# Patient Record
Sex: MALE | Race: WHITE | NOT HISPANIC OR LATINO | Employment: OTHER | URBAN - NONMETROPOLITAN AREA
[De-identification: names, ages, dates, MRNs, and addresses within clinical notes are randomized per-mention and may not be internally consistent; named-entity substitution may affect disease eponyms.]

---

## 2024-01-17 ENCOUNTER — HOSPITAL ENCOUNTER (EMERGENCY)
Facility: HOSPITAL | Age: 89
Discharge: HOME | End: 2024-01-17
Attending: STUDENT IN AN ORGANIZED HEALTH CARE EDUCATION/TRAINING PROGRAM | Admitting: STUDENT IN AN ORGANIZED HEALTH CARE EDUCATION/TRAINING PROGRAM
Payer: MEDICARE

## 2024-01-17 VITALS
WEIGHT: 200 LBS | TEMPERATURE: 97.8 F | RESPIRATION RATE: 18 BRPM | DIASTOLIC BLOOD PRESSURE: 80 MMHG | BODY MASS INDEX: 28.63 KG/M2 | HEART RATE: 72 BPM | HEIGHT: 70 IN | OXYGEN SATURATION: 94 % | SYSTOLIC BLOOD PRESSURE: 146 MMHG

## 2024-01-17 DIAGNOSIS — T83.9XXA PROBLEM WITH FOLEY CATHETER, INITIAL ENCOUNTER: Primary | ICD-10-CM

## 2024-01-17 LAB
ALBUMIN SERPL-MCNC: 3.8 G/DL (ref 3.5–5.2)
ALBUMIN/GLOB SERPL: 1.2 G/DL
ALP SERPL-CCNC: 95 U/L (ref 39–117)
ALT SERPL W P-5'-P-CCNC: 13 U/L (ref 1–41)
ANION GAP SERPL CALCULATED.3IONS-SCNC: 9.1 MMOL/L (ref 5–15)
AST SERPL-CCNC: 16 U/L (ref 1–40)
BASOPHILS # BLD AUTO: 0.03 10*3/MM3 (ref 0–0.2)
BASOPHILS NFR BLD AUTO: 0.3 % (ref 0–1.5)
BILIRUB SERPL-MCNC: 0.5 MG/DL (ref 0–1.2)
BUN SERPL-MCNC: 20 MG/DL (ref 8–23)
BUN/CREAT SERPL: 14 (ref 7–25)
CALCIUM SPEC-SCNC: 9.3 MG/DL (ref 8.2–9.6)
CHLORIDE SERPL-SCNC: 102 MMOL/L (ref 98–107)
CO2 SERPL-SCNC: 26.9 MMOL/L (ref 22–29)
CREAT SERPL-MCNC: 1.43 MG/DL (ref 0.76–1.27)
DEPRECATED RDW RBC AUTO: 50.5 FL (ref 37–54)
EGFRCR SERPLBLD CKD-EPI 2021: 46.3 ML/MIN/1.73
EOSINOPHIL # BLD AUTO: 0.07 10*3/MM3 (ref 0–0.4)
EOSINOPHIL NFR BLD AUTO: 0.7 % (ref 0.3–6.2)
ERYTHROCYTE [DISTWIDTH] IN BLOOD BY AUTOMATED COUNT: 15.2 % (ref 12.3–15.4)
GLOBULIN UR ELPH-MCNC: 3.1 GM/DL
GLUCOSE SERPL-MCNC: 109 MG/DL (ref 65–99)
HCT VFR BLD AUTO: 45.8 % (ref 37.5–51)
HGB BLD-MCNC: 14.5 G/DL (ref 13–17.7)
HOLD SPECIMEN: NORMAL
HOLD SPECIMEN: NORMAL
IMM GRANULOCYTES # BLD AUTO: 0.05 10*3/MM3 (ref 0–0.05)
IMM GRANULOCYTES NFR BLD AUTO: 0.5 % (ref 0–0.5)
LYMPHOCYTES # BLD AUTO: 1.35 10*3/MM3 (ref 0.7–3.1)
LYMPHOCYTES NFR BLD AUTO: 14.1 % (ref 19.6–45.3)
MCH RBC QN AUTO: 29.1 PG (ref 26.6–33)
MCHC RBC AUTO-ENTMCNC: 31.7 G/DL (ref 31.5–35.7)
MCV RBC AUTO: 91.8 FL (ref 79–97)
MONOCYTES # BLD AUTO: 0.79 10*3/MM3 (ref 0.1–0.9)
MONOCYTES NFR BLD AUTO: 8.3 % (ref 5–12)
NEUTROPHILS NFR BLD AUTO: 7.26 10*3/MM3 (ref 1.7–7)
NEUTROPHILS NFR BLD AUTO: 76.1 % (ref 42.7–76)
NRBC BLD AUTO-RTO: 0 /100 WBC (ref 0–0.2)
PLATELET # BLD AUTO: 149 10*3/MM3 (ref 140–450)
PMV BLD AUTO: 9.6 FL (ref 6–12)
POTASSIUM SERPL-SCNC: 4.9 MMOL/L (ref 3.5–5.2)
PROT SERPL-MCNC: 6.9 G/DL (ref 6–8.5)
RBC # BLD AUTO: 4.99 10*6/MM3 (ref 4.14–5.8)
SODIUM SERPL-SCNC: 138 MMOL/L (ref 136–145)
WBC NRBC COR # BLD AUTO: 9.55 10*3/MM3 (ref 3.4–10.8)
WHOLE BLOOD HOLD COAG: NORMAL
WHOLE BLOOD HOLD SPECIMEN: NORMAL

## 2024-01-17 PROCEDURE — 80053 COMPREHEN METABOLIC PANEL: CPT | Performed by: STUDENT IN AN ORGANIZED HEALTH CARE EDUCATION/TRAINING PROGRAM

## 2024-01-17 PROCEDURE — 85025 COMPLETE CBC W/AUTO DIFF WBC: CPT | Performed by: STUDENT IN AN ORGANIZED HEALTH CARE EDUCATION/TRAINING PROGRAM

## 2024-01-17 PROCEDURE — 36415 COLL VENOUS BLD VENIPUNCTURE: CPT

## 2024-01-17 PROCEDURE — 51702 INSERT TEMP BLADDER CATH: CPT

## 2024-01-17 PROCEDURE — 99282 EMERGENCY DEPT VISIT SF MDM: CPT

## 2024-01-17 NOTE — ED PROVIDER NOTES
Subjective   History of Present Illness  Patient is a 89-year-old male with history significant for chronic urinary retention due to BPH with indwelling Leos and recent right lower extremity DVT who comes to the ER with complaints of Leos catheter coming out and right lower extremity swelling.  He reports he has been off Eliquis for the last few days however right lower extremity has been swollen for several weeks worse on the left.  This does get worse throughout the day and the more he is on his feet.  He has been holding his Eliquis due to a  procedure in which he is going to have warts removed from the genitals.  He reports he has a chronic indwelling Leos however the Elos catheter slid out today-state the bulb of it was busted and came out easily.  He came to the ER for this to be replaced.  Denies any fevers or chills or any other symptoms.      Review of Systems   Constitutional:  Negative for chills, fatigue and fever.   HENT:  Negative for congestion, sinus pain and sore throat.    Respiratory:  Negative for cough, chest tightness, shortness of breath and wheezing.    Cardiovascular:  Positive for leg swelling. Negative for chest pain and palpitations.   Gastrointestinal:  Negative for abdominal pain, constipation, diarrhea, nausea and vomiting.   Genitourinary:  Negative for dysuria, frequency, hematuria and urgency.   Musculoskeletal:  Negative for arthralgias and myalgias.   Neurological:  Negative for dizziness, numbness and headaches.   Psychiatric/Behavioral:  Negative for confusion.        Past Medical History:   Diagnosis Date    Dementia     Renal disorder        Allergies   Allergen Reactions    Penicillins Hives       No past surgical history on file.    Family History   Problem Relation Age of Onset    No Known Problems Father     No Known Problems Mother        Social History     Socioeconomic History    Marital status:    Tobacco Use    Smoking status: Former    Smokeless tobacco:  Never   Vaping Use    Vaping Use: Never used   Substance and Sexual Activity    Alcohol use: Never    Drug use: Never    Sexual activity: Defer           Objective   Physical Exam  Vitals and nursing note reviewed. Exam conducted with a chaperone present.   Constitutional:       General: He is not in acute distress.     Appearance: He is well-developed and normal weight. He is not ill-appearing.   HENT:      Head: Normocephalic.      Nose: Nose normal.      Mouth/Throat:      Mouth: Mucous membranes are moist.      Pharynx: Oropharynx is clear.   Eyes:      Extraocular Movements: Extraocular movements intact.      Pupils: Pupils are equal, round, and reactive to light.   Neck:      Vascular: No JVD.   Cardiovascular:      Rate and Rhythm: Normal rate and regular rhythm.      Heart sounds: No murmur heard.     No friction rub. No gallop.   Pulmonary:      Effort: Pulmonary effort is normal. No tachypnea.      Breath sounds: Normal breath sounds. No decreased breath sounds, wheezing, rhonchi or rales.   Abdominal:      General: Bowel sounds are normal.      Palpations: Abdomen is soft.   Musculoskeletal:         General: Normal range of motion.      Cervical back: Normal range of motion and neck supple.      Right lower leg: Edema present.      Left lower leg: Edema present.   Skin:     General: Skin is warm and dry.      Capillary Refill: Capillary refill takes less than 2 seconds.   Neurological:      General: No focal deficit present.      Mental Status: He is alert and oriented to person, place, and time.   Psychiatric:         Mood and Affect: Mood normal.         Behavior: Behavior normal.         Procedures           ED Course                                             Medical Decision Making  --Patient is hemodynamically stable  --Labs unremarkable  --Leos catheter reinserted  --Instructed to wear compression stockings, elevate legs at rest, follow-up with PCP in 1 week and keep urology appointments as  scheduled    Amount and/or Complexity of Data Reviewed  Labs: ordered.        Final diagnoses:   Problem with Leos catheter, initial encounter       ED Disposition  ED Disposition       ED Disposition   Discharge    Condition   Stable    Comment   --               Babar Marcus MD  [address] [facility] [address] [phone]    In 1 week      Jorge Alberto Francis MD  [address] [facility] [address]  [place] [place] [address] [phone]    Call            Medication List      No changes were made to your prescriptions during this visit.            Denis Medina DO  01/17/24 1917

## 2024-02-20 ENCOUNTER — PROCEDURE VISIT (OUTPATIENT)
Dept: UROLOGY | Facility: CLINIC | Age: 89
End: 2024-02-20
Payer: MEDICARE

## 2024-02-20 VITALS
HEIGHT: 70 IN | BODY MASS INDEX: 24.51 KG/M2 | SYSTOLIC BLOOD PRESSURE: 146 MMHG | HEART RATE: 68 BPM | DIASTOLIC BLOOD PRESSURE: 82 MMHG | WEIGHT: 171.2 LBS

## 2024-02-20 DIAGNOSIS — A63.0 GENITAL WARTS: Primary | ICD-10-CM

## 2024-02-20 PROCEDURE — 99213 OFFICE O/P EST LOW 20 MIN: CPT | Performed by: UROLOGY

## 2024-02-20 NOTE — H&P (VIEW-ONLY)
Chief Complaint:      Chief Complaint   Patient presents with    Genital Warts     Fulguration     Leos Cath Change       HPI:   89 y.o. male here for fulguration of genital warts but there is so many and what has an appearance of cancer I will recommend a circumcision I will arrange this under local because of risk factors.                   Past Medical History:     Past Medical History:   Diagnosis Date    Dementia     Renal disorder        Current Meds:     Current Outpatient Medications   Medication Sig Dispense Refill    Apixaban Starter Pack tablet therapy pack Take two 5 mg tablets by mouth every 12 hours for 7 days. Followed by one 5 mg tablet every 12 hours. (Dispense starter pack if available) 74 tablet 0    cetirizine (zyrTEC) 10 MG tablet Take 1 tablet by mouth Daily.      clotrimazole-betamethasone (Lotrisone) 1-0.05 % cream Apply 1 application topically to the appropriate area as directed 2 (Two) Times a Day. 45 g 2    Cyanocobalamin (Vitamin B 12) 100 MCG lozenge Take 100 mcg by mouth 1 (One) Time Per Week.      furosemide (LASIX) 20 MG tablet Take 1 tablet by mouth Daily As Needed.      imiquimod (ALDARA) 5 % cream Apply to warts 3 times weekly at bedtime.  Leave on for 6 to 10 hours. 12 each 1    phenazopyridine (Pyridium) 200 MG tablet Take 1 tablet by mouth 3 (Three) Times a Day As Needed for Bladder Spasms. 20 tablet 0    potassium chloride (MICRO-K) 10 MEQ CR capsule TAKE 1 TABLET BY MOUTH EVERY DAY WITH LASIX AS NEEDED      terazosin (HYTRIN) 5 MG capsule Take 1 capsule by mouth Every Night.       No current facility-administered medications for this visit.        Allergies:      Allergies   Allergen Reactions    Penicillins Hives        Past Surgical History:     History reviewed. No pertinent surgical history.    Social History:     Social History     Socioeconomic History    Marital status:    Tobacco Use    Smoking status: Former    Smokeless tobacco: Never   Vaping Use    Vaping  Use: Never used   Substance and Sexual Activity    Alcohol use: Never    Drug use: Never    Sexual activity: Defer       Family History:     Family History   Problem Relation Age of Onset    No Known Problems Father     No Known Problems Mother        Review of Systems:     Review of Systems   Constitutional: Negative.    HENT: Negative.     Eyes: Negative.    Respiratory: Negative.     Cardiovascular: Negative.    Gastrointestinal: Negative.    Endocrine: Negative.    Musculoskeletal: Negative.    Allergic/Immunologic: Negative.    Neurological: Negative.    Hematological: Negative.    Psychiatric/Behavioral: Negative.         Physical Exam:     Physical Exam  Vitals and nursing note reviewed.   Constitutional:       Appearance: He is well-developed.   HENT:      Head: Normocephalic and atraumatic.   Eyes:      Conjunctiva/sclera: Conjunctivae normal.      Pupils: Pupils are equal, round, and reactive to light.   Cardiovascular:      Rate and Rhythm: Normal rate and regular rhythm.      Heart sounds: Normal heart sounds.   Pulmonary:      Effort: Pulmonary effort is normal.      Breath sounds: Normal breath sounds.   Abdominal:      General: Bowel sounds are normal.      Palpations: Abdomen is soft.   Genitourinary:     Comments: Extensive preputial genital warts with several having more of a carcinomatous appearance  Musculoskeletal:         General: Normal range of motion.      Cervical back: Normal range of motion.   Skin:     General: Skin is warm and dry.   Neurological:      Mental Status: He is alert and oriented to person, place, and time.      Deep Tendon Reflexes: Reflexes are normal and symmetric.   Psychiatric:         Behavior: Behavior normal.         Thought Content: Thought content normal.         Judgment: Judgment normal.         I have reviewed the following portions of the patient's history: Allergies, current medications, past family history, past medical history, past social history, past  surgical history, problem list, and ROS and confirm it is accurate.  Recent Image (CT and/or KUB):      CT Abdomen and Pelvis: No results found for this or any previous visit.       CT Stone Protocol: Results for orders placed during the hospital encounter of 07/26/22    CT Abdomen Pelvis Stone Protocol    Narrative  CT Abdomen Pelvis WO    INDICATION:  Hematuria    TECHNIQUE:  CT of the abdomen and pelvis without IV contrast. Coronal and sagittal reconstructions were obtained.  Radiation dose reduction techniques included automated exposure control or exposure modulation based on body size. Count of known CT and cardiac nuc  med studies performed in previous 12 months: 1.    COMPARISON:  6/30/2022    FINDINGS:  Abdomen: Multiple hepatic cysts are again seen, unchanged. Nonspecific low-density splenic lesions are again noted, also unchanged. The pancreas is unremarkable. There is a 2 to 3 cm duodenal diverticulum again noted. Bilateral hydronephrosis is seen and  this has increased since the previous examination. Both ureters are dilated down to the bladder but no ureteral stones are seen. The bladder is distended. There is a Leos catheter. The balloon is within the penile urethra. No evidence of retroperitoneal  adenopathy. No distended bowel loops.    Pelvis: Normal appendix. No adenopathy or pelvic mass.    Impression  The Leos catheter has slipped distally such that the balloon is in the penile urethra. The bladder is distended consistent with bladder outlet obstruction and there is bilateral hydronephrosis.          Signer Name: Ricky Arredondo MD  Signed: 7/26/2022 7:13 PM  Workstation Name: [id number]  [facility]       KUB: No results found for this or any previous visit.       Labs (past 3 months):      Admission on 01/17/2024, Discharged on 01/17/2024   Component Date Value Ref Range Status    Glucose 01/17/2024 109 (H)  65 - 99 mg/dL Final    BUN 01/17/2024 20  8 - 23 mg/dL Final     Creatinine 01/17/2024 1.43 (H)  0.76 - 1.27 mg/dL Final    Sodium 01/17/2024 138  136 - 145 mmol/L Final    Potassium 01/17/2024 4.9  3.5 - 5.2 mmol/L Final    Chloride 01/17/2024 102  98 - 107 mmol/L Final    CO2 01/17/2024 26.9  22.0 - 29.0 mmol/L Final    Calcium 01/17/2024 9.3  8.2 - 9.6 mg/dL Final    Total Protein 01/17/2024 6.9  6.0 - 8.5 g/dL Final    Albumin 01/17/2024 3.8  3.5 - 5.2 g/dL Final    ALT (SGPT) 01/17/2024 13  1 - 41 U/L Final    AST (SGOT) 01/17/2024 16  1 - 40 U/L Final    Alkaline Phosphatase 01/17/2024 95  39 - 117 U/L Final    Total Bilirubin 01/17/2024 0.5  0.0 - 1.2 mg/dL Final    Globulin 01/17/2024 3.1  gm/dL Final    A/G Ratio 01/17/2024 1.2  g/dL Final    BUN/Creatinine Ratio 01/17/2024 14.0  7.0 - 25.0 Final    Anion Gap 01/17/2024 9.1  5.0 - 15.0 mmol/L Final    eGFR 01/17/2024 46.3 (L)  >60.0 mL/min/1.73 Final    WBC 01/17/2024 9.55  3.40 - 10.80 10*3/mm3 Final    RBC 01/17/2024 4.99  4.14 - 5.80 10*6/mm3 Final    Hemoglobin 01/17/2024 14.5  13.0 - 17.7 g/dL Final    Hematocrit 01/17/2024 45.8  37.5 - 51.0 % Final    MCV 01/17/2024 91.8  79.0 - 97.0 fL Final    MCH 01/17/2024 29.1  26.6 - 33.0 pg Final    MCHC 01/17/2024 31.7  31.5 - 35.7 g/dL Final    RDW 01/17/2024 15.2  12.3 - 15.4 % Final    RDW-SD 01/17/2024 50.5  37.0 - 54.0 fl Final    MPV 01/17/2024 9.6  6.0 - 12.0 fL Final    Platelets 01/17/2024 149  140 - 450 10*3/mm3 Final    Neutrophil % 01/17/2024 76.1 (H)  42.7 - 76.0 % Final    Lymphocyte % 01/17/2024 14.1 (L)  19.6 - 45.3 % Final    Monocyte % 01/17/2024 8.3  5.0 - 12.0 % Final    Eosinophil % 01/17/2024 0.7  0.3 - 6.2 % Final    Basophil % 01/17/2024 0.3  0.0 - 1.5 % Final    Immature Grans % 01/17/2024 0.5  0.0 - 0.5 % Final    Neutrophils, Absolute 01/17/2024 7.26 (H)  1.70 - 7.00 10*3/mm3 Final    Lymphocytes, Absolute 01/17/2024 1.35  0.70 - 3.10 10*3/mm3 Final    Monocytes, Absolute 01/17/2024 0.79  0.10 - 0.90 10*3/mm3 Final    Eosinophils, Absolute  01/17/2024 0.07  0.00 - 0.40 10*3/mm3 Final    Basophils, Absolute 01/17/2024 0.03  0.00 - 0.20 10*3/mm3 Final    Immature Grans, Absolute 01/17/2024 0.05  0.00 - 0.05 10*3/mm3 Final    nRBC 01/17/2024 0.0  0.0 - 0.2 /100 WBC Final    Extra Tube 01/17/2024 Hold for add-ons.   Final    Auto resulted.    Extra Tube 01/17/2024 hold for add-on   Final    Auto resulted    Extra Tube 01/17/2024 Hold for add-ons.   Final    Auto resulted.    Extra Tube 01/17/2024 Hold for add-ons.   Final    Auto resulted        Procedure:       Assessment/Plan:   Penile warts-originally set up for fulguration I think he needs a formal circumcision        This document has been electronically signed by REGGIE SMITH MD February 20, 2024 13:29 EST    Dictated Utilizing Dragon Dictation: Part of this note may be an electronic transcription/translation of spoken language to printed text using the Dragon Dictation System.

## 2024-02-20 NOTE — PROGRESS NOTES
Chief Complaint:      Chief Complaint   Patient presents with    Genital Warts     Fulguration     Leos Cath Change       HPI:   89 y.o. male here for fulguration of genital warts but there is so many and what has an appearance of cancer I will recommend a circumcision I will arrange this under local because of risk factors.                   Past Medical History:     Past Medical History:   Diagnosis Date    Dementia     Renal disorder        Current Meds:     Current Outpatient Medications   Medication Sig Dispense Refill    Apixaban Starter Pack tablet therapy pack Take two 5 mg tablets by mouth every 12 hours for 7 days. Followed by one 5 mg tablet every 12 hours. (Dispense starter pack if available) 74 tablet 0    cetirizine (zyrTEC) 10 MG tablet Take 1 tablet by mouth Daily.      clotrimazole-betamethasone (Lotrisone) 1-0.05 % cream Apply 1 application topically to the appropriate area as directed 2 (Two) Times a Day. 45 g 2    Cyanocobalamin (Vitamin B 12) 100 MCG lozenge Take 100 mcg by mouth 1 (One) Time Per Week.      furosemide (LASIX) 20 MG tablet Take 1 tablet by mouth Daily As Needed.      imiquimod (ALDARA) 5 % cream Apply to warts 3 times weekly at bedtime.  Leave on for 6 to 10 hours. 12 each 1    phenazopyridine (Pyridium) 200 MG tablet Take 1 tablet by mouth 3 (Three) Times a Day As Needed for Bladder Spasms. 20 tablet 0    potassium chloride (MICRO-K) 10 MEQ CR capsule TAKE 1 TABLET BY MOUTH EVERY DAY WITH LASIX AS NEEDED      terazosin (HYTRIN) 5 MG capsule Take 1 capsule by mouth Every Night.       No current facility-administered medications for this visit.        Allergies:      Allergies   Allergen Reactions    Penicillins Hives        Past Surgical History:     History reviewed. No pertinent surgical history.    Social History:     Social History     Socioeconomic History    Marital status:    Tobacco Use    Smoking status: Former    Smokeless tobacco: Never   Vaping Use    Vaping  Use: Never used   Substance and Sexual Activity    Alcohol use: Never    Drug use: Never    Sexual activity: Defer       Family History:     Family History   Problem Relation Age of Onset    No Known Problems Father     No Known Problems Mother        Review of Systems:     Review of Systems   Constitutional: Negative.    HENT: Negative.     Eyes: Negative.    Respiratory: Negative.     Cardiovascular: Negative.    Gastrointestinal: Negative.    Endocrine: Negative.    Musculoskeletal: Negative.    Allergic/Immunologic: Negative.    Neurological: Negative.    Hematological: Negative.    Psychiatric/Behavioral: Negative.         Physical Exam:     Physical Exam  Vitals and nursing note reviewed.   Constitutional:       Appearance: He is well-developed.   HENT:      Head: Normocephalic and atraumatic.   Eyes:      Conjunctiva/sclera: Conjunctivae normal.      Pupils: Pupils are equal, round, and reactive to light.   Cardiovascular:      Rate and Rhythm: Normal rate and regular rhythm.      Heart sounds: Normal heart sounds.   Pulmonary:      Effort: Pulmonary effort is normal.      Breath sounds: Normal breath sounds.   Abdominal:      General: Bowel sounds are normal.      Palpations: Abdomen is soft.   Genitourinary:     Comments: Extensive preputial genital warts with several having more of a carcinomatous appearance  Musculoskeletal:         General: Normal range of motion.      Cervical back: Normal range of motion.   Skin:     General: Skin is warm and dry.   Neurological:      Mental Status: He is alert and oriented to person, place, and time.      Deep Tendon Reflexes: Reflexes are normal and symmetric.   Psychiatric:         Behavior: Behavior normal.         Thought Content: Thought content normal.         Judgment: Judgment normal.         I have reviewed the following portions of the patient's history: Allergies, current medications, past family history, past medical history, past social history, past  surgical history, problem list, and ROS and confirm it is accurate.  Recent Image (CT and/or KUB):      CT Abdomen and Pelvis: No results found for this or any previous visit.       CT Stone Protocol: Results for orders placed during the hospital encounter of 07/26/22    CT Abdomen Pelvis Stone Protocol    Narrative  CT Abdomen Pelvis WO    INDICATION:  Hematuria    TECHNIQUE:  CT of the abdomen and pelvis without IV contrast. Coronal and sagittal reconstructions were obtained.  Radiation dose reduction techniques included automated exposure control or exposure modulation based on body size. Count of known CT and cardiac nuc  med studies performed in previous 12 months: 1.    COMPARISON:  6/30/2022    FINDINGS:  Abdomen: Multiple hepatic cysts are again seen, unchanged. Nonspecific low-density splenic lesions are again noted, also unchanged. The pancreas is unremarkable. There is a 2 to 3 cm duodenal diverticulum again noted. Bilateral hydronephrosis is seen and  this has increased since the previous examination. Both ureters are dilated down to the bladder but no ureteral stones are seen. The bladder is distended. There is a Leos catheter. The balloon is within the penile urethra. No evidence of retroperitoneal  adenopathy. No distended bowel loops.    Pelvis: Normal appendix. No adenopathy or pelvic mass.    Impression  The Leos catheter has slipped distally such that the balloon is in the penile urethra. The bladder is distended consistent with bladder outlet obstruction and there is bilateral hydronephrosis.          Signer Name: Ricky Arredondo MD  Signed: 7/26/2022 7:13 PM  Workstation Name: [id number]  [facility]       KUB: No results found for this or any previous visit.       Labs (past 3 months):      Admission on 01/17/2024, Discharged on 01/17/2024   Component Date Value Ref Range Status    Glucose 01/17/2024 109 (H)  65 - 99 mg/dL Final    BUN 01/17/2024 20  8 - 23 mg/dL Final     Creatinine 01/17/2024 1.43 (H)  0.76 - 1.27 mg/dL Final    Sodium 01/17/2024 138  136 - 145 mmol/L Final    Potassium 01/17/2024 4.9  3.5 - 5.2 mmol/L Final    Chloride 01/17/2024 102  98 - 107 mmol/L Final    CO2 01/17/2024 26.9  22.0 - 29.0 mmol/L Final    Calcium 01/17/2024 9.3  8.2 - 9.6 mg/dL Final    Total Protein 01/17/2024 6.9  6.0 - 8.5 g/dL Final    Albumin 01/17/2024 3.8  3.5 - 5.2 g/dL Final    ALT (SGPT) 01/17/2024 13  1 - 41 U/L Final    AST (SGOT) 01/17/2024 16  1 - 40 U/L Final    Alkaline Phosphatase 01/17/2024 95  39 - 117 U/L Final    Total Bilirubin 01/17/2024 0.5  0.0 - 1.2 mg/dL Final    Globulin 01/17/2024 3.1  gm/dL Final    A/G Ratio 01/17/2024 1.2  g/dL Final    BUN/Creatinine Ratio 01/17/2024 14.0  7.0 - 25.0 Final    Anion Gap 01/17/2024 9.1  5.0 - 15.0 mmol/L Final    eGFR 01/17/2024 46.3 (L)  >60.0 mL/min/1.73 Final    WBC 01/17/2024 9.55  3.40 - 10.80 10*3/mm3 Final    RBC 01/17/2024 4.99  4.14 - 5.80 10*6/mm3 Final    Hemoglobin 01/17/2024 14.5  13.0 - 17.7 g/dL Final    Hematocrit 01/17/2024 45.8  37.5 - 51.0 % Final    MCV 01/17/2024 91.8  79.0 - 97.0 fL Final    MCH 01/17/2024 29.1  26.6 - 33.0 pg Final    MCHC 01/17/2024 31.7  31.5 - 35.7 g/dL Final    RDW 01/17/2024 15.2  12.3 - 15.4 % Final    RDW-SD 01/17/2024 50.5  37.0 - 54.0 fl Final    MPV 01/17/2024 9.6  6.0 - 12.0 fL Final    Platelets 01/17/2024 149  140 - 450 10*3/mm3 Final    Neutrophil % 01/17/2024 76.1 (H)  42.7 - 76.0 % Final    Lymphocyte % 01/17/2024 14.1 (L)  19.6 - 45.3 % Final    Monocyte % 01/17/2024 8.3  5.0 - 12.0 % Final    Eosinophil % 01/17/2024 0.7  0.3 - 6.2 % Final    Basophil % 01/17/2024 0.3  0.0 - 1.5 % Final    Immature Grans % 01/17/2024 0.5  0.0 - 0.5 % Final    Neutrophils, Absolute 01/17/2024 7.26 (H)  1.70 - 7.00 10*3/mm3 Final    Lymphocytes, Absolute 01/17/2024 1.35  0.70 - 3.10 10*3/mm3 Final    Monocytes, Absolute 01/17/2024 0.79  0.10 - 0.90 10*3/mm3 Final    Eosinophils, Absolute  01/17/2024 0.07  0.00 - 0.40 10*3/mm3 Final    Basophils, Absolute 01/17/2024 0.03  0.00 - 0.20 10*3/mm3 Final    Immature Grans, Absolute 01/17/2024 0.05  0.00 - 0.05 10*3/mm3 Final    nRBC 01/17/2024 0.0  0.0 - 0.2 /100 WBC Final    Extra Tube 01/17/2024 Hold for add-ons.   Final    Auto resulted.    Extra Tube 01/17/2024 hold for add-on   Final    Auto resulted    Extra Tube 01/17/2024 Hold for add-ons.   Final    Auto resulted.    Extra Tube 01/17/2024 Hold for add-ons.   Final    Auto resulted        Procedure:       Assessment/Plan:   Penile warts-originally set up for fulguration I think he needs a formal circumcision        This document has been electronically signed by REGGIE SMITH MD February 20, 2024 13:29 EST    Dictated Utilizing Dragon Dictation: Part of this note may be an electronic transcription/translation of spoken language to printed text using the Dragon Dictation System.

## 2024-02-22 DIAGNOSIS — A63.0 GENITAL WARTS: Primary | ICD-10-CM

## 2024-02-22 DIAGNOSIS — R79.89 ABNORMAL CBC: ICD-10-CM

## 2024-02-22 DIAGNOSIS — N47.1 PHIMOSIS OF PENIS: ICD-10-CM

## 2024-02-22 RX ORDER — GENTAMICIN SULFATE 80 MG/100ML
80 INJECTION, SOLUTION INTRAVENOUS ONCE
OUTPATIENT
Start: 2024-02-22 | End: 2024-02-22

## 2024-02-26 ENCOUNTER — TELEPHONE (OUTPATIENT)
Dept: UROLOGY | Facility: CLINIC | Age: 89
End: 2024-02-26
Payer: MEDICARE

## 2024-02-26 NOTE — TELEPHONE ENCOUNTER
I tried to call the pt and let him know his curcumcision was scheduled for 3/11/24@730 am and pat was scheduled for 3/8/24@1130 am and left a detailed vma nd will mail surgery notice as well.

## 2024-03-06 NOTE — DISCHARGE INSTRUCTIONS
Please discontinue all blood thinners and anticoagulants (except aspirin) prior to surgery as per your surgeon and cardiologist instructions.  Aspirin may be continued up to the day prior to surgery.    HOLD all diabetic medications the morning of surgery as order by physician.    Please follow instructions on use of prep cloths provided by nurse. Return instruction sheet to pre-op nurse on day of surgery.    Arrival time for surgery on  3/11/24 will be given to you by Dr. Francis's  Office.(0730 AM)    A RESPONSIBLE PERSON MUST REMAIN IN THE WAITING ROOM DURING YOUR PROCEDURE AND A RESPONSIBLE  MUST BE AVAILABLE UPON YOUR DISCHARGE.    General Instructions:  Do NOT eat or drink after midnight 3/10/24 which includes water, mints, or gum.  You may brush your teeth. Dental appliances that are removable must be taken out day of surgery.  Do NOT smoke, chew tobacco, or drink alcohol within 24 hours prior to surgery.  Bring medications in original bottles, any inhalers and if applicable your C-PAP/BI-PAP machine  Bring any papers given to you in the doctor’s office  Wear clean, comfortable clothes and socks  Do NOT wear contact lenses or make-up or dark nail polish.  Bring a case for your glasses if applicable.  Bring crutches or walker if applicable  Leave all other valuables and jewelry at home  If you were given a blood bank armband, continue to wear it until discharged.    Preventing a Surgical Site Infection:  Shower the night before surgery (unless instructed otherwise) using a fresh bar of anti-bacterial soap (such as Dial) and clean washcloth.  Dry with a clean towel and dress in clean clothing.  For 2 to 3 days before surgery, avoid shaving with a razor near where you will have surgery because the razor can irritate skin and make it easier to develop an infection.  Ask your surgeon if you will be receiving antibiotics prior to surgery.  Make sure you, your family, and all healthcare providers clean their  hands with soap and water or an alcohol-based hand  before caring for you or your wound.  If at all possible, quit smoking as many days before surgery as you can.    Day of Surgery:  Upon arrival, a pre-op nurse and anesthesiologist will review your health history, obtain vital signs, and answer questions you may have.  The only belongings needed at this time will be your home medications and if applicable you C-PAP/BI-PAP machine.  If you are staying overnight, your family can leave the rest of your belongings in the car and bring them to your room later.  A pre-op nurse will start an IV and you may receive medication in preparation for surgery.  Due to patient privacy and limited space, only one member of your family will be able to accompany you in the pre-op area.  While you are in surgery your family should notify the waiting room  if they leave the waiting room area and provide a contact number.  Please be aware that surgery does come with discomfort.  We want to make every effort to control your discomfort so please discuss any uncontrolled symptoms with your nurse.  Your doctor will most likely have prescribed pain medications.  If you are going home after surgery you will receive individualized written care instructions before being discharged.  A responsible adult must drive you to and from the hospital on the day of surgery and stay with you for 24 hours.  If you are staying overnight following surgery, you will be transported to your hospital room following the recovery period.

## 2024-03-08 ENCOUNTER — PRE-ADMISSION TESTING (OUTPATIENT)
Dept: PREADMISSION TESTING | Facility: HOSPITAL | Age: 89
End: 2024-03-08
Payer: MEDICARE

## 2024-03-08 LAB
ANION GAP SERPL CALCULATED.3IONS-SCNC: 9.9 MMOL/L (ref 5–15)
BUN SERPL-MCNC: 16 MG/DL (ref 8–23)
BUN/CREAT SERPL: 13.1 (ref 7–25)
CALCIUM SPEC-SCNC: 9 MG/DL (ref 8.2–9.6)
CHLORIDE SERPL-SCNC: 104 MMOL/L (ref 98–107)
CO2 SERPL-SCNC: 25.1 MMOL/L (ref 22–29)
CREAT SERPL-MCNC: 1.22 MG/DL (ref 0.76–1.27)
DEPRECATED RDW RBC AUTO: 51.2 FL (ref 37–54)
EGFRCR SERPLBLD CKD-EPI 2021: 56 ML/MIN/1.73
ERYTHROCYTE [DISTWIDTH] IN BLOOD BY AUTOMATED COUNT: 15.1 % (ref 12.3–15.4)
GLUCOSE SERPL-MCNC: 76 MG/DL (ref 65–99)
HCT VFR BLD AUTO: 45.5 % (ref 37.5–51)
HGB BLD-MCNC: 14.2 G/DL (ref 13–17.7)
MCH RBC QN AUTO: 28.9 PG (ref 26.6–33)
MCHC RBC AUTO-ENTMCNC: 31.2 G/DL (ref 31.5–35.7)
MCV RBC AUTO: 92.5 FL (ref 79–97)
PLATELET # BLD AUTO: 164 10*3/MM3 (ref 140–450)
PMV BLD AUTO: 10.5 FL (ref 6–12)
POTASSIUM SERPL-SCNC: 4 MMOL/L (ref 3.5–5.2)
QT INTERVAL: 372 MS
QTC INTERVAL: 377 MS
RBC # BLD AUTO: 4.92 10*6/MM3 (ref 4.14–5.8)
SODIUM SERPL-SCNC: 139 MMOL/L (ref 136–145)
WBC NRBC COR # BLD AUTO: 8.11 10*3/MM3 (ref 3.4–10.8)

## 2024-03-08 PROCEDURE — 93010 ELECTROCARDIOGRAM REPORT: CPT | Performed by: INTERNAL MEDICINE

## 2024-03-08 PROCEDURE — 93005 ELECTROCARDIOGRAM TRACING: CPT

## 2024-03-08 PROCEDURE — 85027 COMPLETE CBC AUTOMATED: CPT

## 2024-03-08 PROCEDURE — 80048 BASIC METABOLIC PNL TOTAL CA: CPT

## 2024-03-08 PROCEDURE — 36415 COLL VENOUS BLD VENIPUNCTURE: CPT

## 2024-03-08 RX ORDER — WARFARIN SODIUM 4 MG/1
4 TABLET ORAL
COMMUNITY

## 2024-03-11 ENCOUNTER — ANESTHESIA (OUTPATIENT)
Dept: PERIOP | Facility: HOSPITAL | Age: 89
End: 2024-03-11
Payer: MEDICARE

## 2024-03-11 ENCOUNTER — ANESTHESIA EVENT (OUTPATIENT)
Dept: PERIOP | Facility: HOSPITAL | Age: 89
End: 2024-03-11
Payer: MEDICARE

## 2024-03-11 ENCOUNTER — HOSPITAL ENCOUNTER (OUTPATIENT)
Facility: HOSPITAL | Age: 89
Setting detail: HOSPITAL OUTPATIENT SURGERY
Discharge: HOME | End: 2024-03-11
Attending: UROLOGY | Admitting: UROLOGY
Payer: MEDICARE

## 2024-03-11 VITALS
RESPIRATION RATE: 18 BRPM | HEIGHT: 70 IN | SYSTOLIC BLOOD PRESSURE: 125 MMHG | OXYGEN SATURATION: 97 % | TEMPERATURE: 98.2 F | DIASTOLIC BLOOD PRESSURE: 67 MMHG | WEIGHT: 173 LBS | BODY MASS INDEX: 24.77 KG/M2 | HEART RATE: 63 BPM

## 2024-03-11 DIAGNOSIS — A63.0 GENITAL WARTS: ICD-10-CM

## 2024-03-11 DIAGNOSIS — R79.89 ABNORMAL CBC: ICD-10-CM

## 2024-03-11 DIAGNOSIS — N47.1 PHIMOSIS OF PENIS: ICD-10-CM

## 2024-03-11 DIAGNOSIS — Z46.6 URINARY CATHETER (FOLEY) CHANGE REQUIRED: Primary | ICD-10-CM

## 2024-03-11 PROCEDURE — 54161 CIRCUM 28 DAYS OR OLDER: CPT | Performed by: UROLOGY

## 2024-03-11 PROCEDURE — 25010000002 PROPOFOL 200 MG/20ML EMULSION: Performed by: NURSE ANESTHETIST, CERTIFIED REGISTERED

## 2024-03-11 PROCEDURE — 54060 EXCISION OF PENIS LESION(S): CPT | Performed by: UROLOGY

## 2024-03-11 PROCEDURE — 25010000002 ONDANSETRON PER 1 MG: Performed by: NURSE ANESTHETIST, CERTIFIED REGISTERED

## 2024-03-11 PROCEDURE — 25810000003 LACTATED RINGERS PER 1000 ML: Performed by: ANESTHESIOLOGY

## 2024-03-11 PROCEDURE — 25810000003 LACTATED RINGERS PER 1000 ML: Performed by: NURSE ANESTHETIST, CERTIFIED REGISTERED

## 2024-03-11 PROCEDURE — 25010000002 GENTAMICIN PER 80 MG

## 2024-03-11 RX ORDER — FENTANYL CITRATE 50 UG/ML
50 INJECTION, SOLUTION INTRAMUSCULAR; INTRAVENOUS
Status: DISCONTINUED | OUTPATIENT
Start: 2024-03-11 | End: 2024-03-11 | Stop reason: HOSPADM

## 2024-03-11 RX ORDER — ONDANSETRON 2 MG/ML
INJECTION INTRAMUSCULAR; INTRAVENOUS AS NEEDED
Status: DISCONTINUED | OUTPATIENT
Start: 2024-03-11 | End: 2024-03-11 | Stop reason: SURG

## 2024-03-11 RX ORDER — LIDOCAINE HYDROCHLORIDE 20 MG/ML
INJECTION, SOLUTION EPIDURAL; INFILTRATION; INTRACAUDAL; PERINEURAL AS NEEDED
Status: DISCONTINUED | OUTPATIENT
Start: 2024-03-11 | End: 2024-03-11 | Stop reason: SURG

## 2024-03-11 RX ORDER — OXYCODONE HYDROCHLORIDE AND ACETAMINOPHEN 5; 325 MG/1; MG/1
1 TABLET ORAL ONCE AS NEEDED
Status: DISCONTINUED | OUTPATIENT
Start: 2024-03-11 | End: 2024-03-11 | Stop reason: HOSPADM

## 2024-03-11 RX ORDER — SODIUM CHLORIDE 0.9 % (FLUSH) 0.9 %
10 SYRINGE (ML) INJECTION AS NEEDED
Status: DISCONTINUED | OUTPATIENT
Start: 2024-03-11 | End: 2024-03-11 | Stop reason: HOSPADM

## 2024-03-11 RX ORDER — PROPOFOL 10 MG/ML
INJECTION, EMULSION INTRAVENOUS AS NEEDED
Status: DISCONTINUED | OUTPATIENT
Start: 2024-03-11 | End: 2024-03-11 | Stop reason: SURG

## 2024-03-11 RX ORDER — HYDROCODONE BITARTRATE AND ACETAMINOPHEN 5; 325 MG/1; MG/1
1-2 TABLET ORAL EVERY 6 HOURS PRN
Qty: 12 TABLET | Refills: 0 | Status: SHIPPED | OUTPATIENT
Start: 2024-03-11

## 2024-03-11 RX ORDER — GENTAMICIN SULFATE 80 MG/100ML
80 INJECTION, SOLUTION INTRAVENOUS ONCE
Status: COMPLETED | OUTPATIENT
Start: 2024-03-11 | End: 2024-03-11

## 2024-03-11 RX ORDER — FAMOTIDINE 10 MG/ML
INJECTION, SOLUTION INTRAVENOUS AS NEEDED
Status: DISCONTINUED | OUTPATIENT
Start: 2024-03-11 | End: 2024-03-11 | Stop reason: SURG

## 2024-03-11 RX ORDER — ONDANSETRON 2 MG/ML
4 INJECTION INTRAMUSCULAR; INTRAVENOUS AS NEEDED
Status: DISCONTINUED | OUTPATIENT
Start: 2024-03-11 | End: 2024-03-11 | Stop reason: HOSPADM

## 2024-03-11 RX ORDER — SODIUM CHLORIDE, SODIUM LACTATE, POTASSIUM CHLORIDE, CALCIUM CHLORIDE 600; 310; 30; 20 MG/100ML; MG/100ML; MG/100ML; MG/100ML
100 INJECTION, SOLUTION INTRAVENOUS ONCE AS NEEDED
Status: DISCONTINUED | OUTPATIENT
Start: 2024-03-11 | End: 2024-03-11 | Stop reason: HOSPADM

## 2024-03-11 RX ORDER — IPRATROPIUM BROMIDE AND ALBUTEROL SULFATE 2.5; .5 MG/3ML; MG/3ML
3 SOLUTION RESPIRATORY (INHALATION) ONCE AS NEEDED
Status: DISCONTINUED | OUTPATIENT
Start: 2024-03-11 | End: 2024-03-11 | Stop reason: HOSPADM

## 2024-03-11 RX ORDER — SODIUM CHLORIDE 0.9 % (FLUSH) 0.9 %
10 SYRINGE (ML) INJECTION EVERY 12 HOURS SCHEDULED
Status: DISCONTINUED | OUTPATIENT
Start: 2024-03-11 | End: 2024-03-11 | Stop reason: HOSPADM

## 2024-03-11 RX ORDER — MAGNESIUM HYDROXIDE 1200 MG/15ML
LIQUID ORAL AS NEEDED
Status: DISCONTINUED | OUTPATIENT
Start: 2024-03-11 | End: 2024-03-11 | Stop reason: HOSPADM

## 2024-03-11 RX ORDER — MIDAZOLAM HYDROCHLORIDE 1 MG/ML
0.5 INJECTION INTRAMUSCULAR; INTRAVENOUS
Status: DISCONTINUED | OUTPATIENT
Start: 2024-03-11 | End: 2024-03-11 | Stop reason: HOSPADM

## 2024-03-11 RX ORDER — SODIUM CHLORIDE, SODIUM LACTATE, POTASSIUM CHLORIDE, CALCIUM CHLORIDE 600; 310; 30; 20 MG/100ML; MG/100ML; MG/100ML; MG/100ML
125 INJECTION, SOLUTION INTRAVENOUS ONCE
Status: COMPLETED | OUTPATIENT
Start: 2024-03-11 | End: 2024-03-11

## 2024-03-11 RX ORDER — SODIUM CHLORIDE 9 MG/ML
40 INJECTION, SOLUTION INTRAVENOUS AS NEEDED
Status: DISCONTINUED | OUTPATIENT
Start: 2024-03-11 | End: 2024-03-11 | Stop reason: HOSPADM

## 2024-03-11 RX ORDER — BACITRACIN ZINC 500 [USP'U]/G
OINTMENT TOPICAL AS NEEDED
Status: DISCONTINUED | OUTPATIENT
Start: 2024-03-11 | End: 2024-03-11 | Stop reason: HOSPADM

## 2024-03-11 RX ORDER — SODIUM CHLORIDE, SODIUM LACTATE, POTASSIUM CHLORIDE, CALCIUM CHLORIDE 600; 310; 30; 20 MG/100ML; MG/100ML; MG/100ML; MG/100ML
INJECTION, SOLUTION INTRAVENOUS CONTINUOUS PRN
Status: DISCONTINUED | OUTPATIENT
Start: 2024-03-11 | End: 2024-03-11 | Stop reason: SURG

## 2024-03-11 RX ORDER — LIDOCAINE HYDROCHLORIDE AND EPINEPHRINE BITARTRATE 20; .01 MG/ML; MG/ML
INJECTION, SOLUTION SUBCUTANEOUS AS NEEDED
Status: DISCONTINUED | OUTPATIENT
Start: 2024-03-11 | End: 2024-03-11 | Stop reason: HOSPADM

## 2024-03-11 RX ADMIN — SODIUM CHLORIDE, POTASSIUM CHLORIDE, SODIUM LACTATE AND CALCIUM CHLORIDE 125 ML/HR: 600; 310; 30; 20 INJECTION, SOLUTION INTRAVENOUS at 07:54

## 2024-03-11 RX ADMIN — PROPOFOL 20 MG: 10 INJECTION, EMULSION INTRAVENOUS at 08:31

## 2024-03-11 RX ADMIN — PROPOFOL 20 MG: 10 INJECTION, EMULSION INTRAVENOUS at 08:06

## 2024-03-11 RX ADMIN — PROPOFOL 20 MG: 10 INJECTION, EMULSION INTRAVENOUS at 08:02

## 2024-03-11 RX ADMIN — PROPOFOL 20 MG: 10 INJECTION, EMULSION INTRAVENOUS at 08:25

## 2024-03-11 RX ADMIN — PROPOFOL 20 MG: 10 INJECTION, EMULSION INTRAVENOUS at 08:12

## 2024-03-11 RX ADMIN — PROPOFOL 20 MG: 10 INJECTION, EMULSION INTRAVENOUS at 08:09

## 2024-03-11 RX ADMIN — FAMOTIDINE 20 MG: 10 INJECTION, SOLUTION INTRAVENOUS at 07:58

## 2024-03-11 RX ADMIN — PROPOFOL 20 MG: 10 INJECTION, EMULSION INTRAVENOUS at 08:21

## 2024-03-11 RX ADMIN — SODIUM CHLORIDE, POTASSIUM CHLORIDE, SODIUM LACTATE AND CALCIUM CHLORIDE: 600; 310; 30; 20 INJECTION, SOLUTION INTRAVENOUS at 08:01

## 2024-03-11 RX ADMIN — GENTAMICIN SULFATE 80 MG: 80 INJECTION, SOLUTION INTRAVENOUS at 08:01

## 2024-03-11 RX ADMIN — LIDOCAINE HYDROCHLORIDE 60 MG: 20 INJECTION, SOLUTION EPIDURAL; INFILTRATION; INTRACAUDAL; PERINEURAL at 08:01

## 2024-03-11 RX ADMIN — ONDANSETRON 4 MG: 2 INJECTION INTRAMUSCULAR; INTRAVENOUS at 08:13

## 2024-03-11 NOTE — ANESTHESIA PREPROCEDURE EVALUATION
Anesthesia Evaluation     Patient summary reviewed and Nursing notes reviewed   no history of anesthetic complications:                Airway   Mallampati: I  TM distance: >3 FB  Neck ROM: full  No difficulty expected  Dental - normal exam     Pulmonary - negative pulmonary ROS and normal exam   Cardiovascular - negative cardio ROS and normal exam  Exercise tolerance: poor (<4 METS)    NYHA Classification: II        Neuro/Psych- negative ROS  (+) psychiatric history, dementia  GI/Hepatic/Renal/Endo - negative ROS   (+) renal disease-    Musculoskeletal (-) negative ROS    Abdominal  - normal exam    Bowel sounds: normal.   Substance History - negative use     OB/GYN negative ob/gyn ROS         Other - negative ROS                         Anesthesia Plan    ASA 2     general     intravenous induction     Anesthetic plan, risks, benefits, and alternatives have been provided, discussed and informed consent has been obtained with: patient.        CODE STATUS:

## 2024-03-11 NOTE — OP NOTE
Stephenie Valdez Jr.  3/11/2024    Pre-op Diagnosis:   Genital warts [A63.0]  Phimosis of penis [N47.1]    Post-op Diagnosis:     Post-Op Diagnosis Codes:     * Genital warts [A63.0]     * Phimosis of penis [N47.1]    Procedure/CPT® Codes:  89-year-old white male with extensive condyle of discharge and a chronic Azevedo.  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  I infiltrated a total of 20 cc of quarter percent Marcaine with epinephrine circumferentially excised this area completely with excellent hemostasis and reanastomosed circumferentially with 3-0 chromic sutures.  I did a shave biopsy of the meatus put a single stitch to stop the bleeding.  Foot and fulgurated the 4 small condylomatous areas with aggregate volume of 2 cm.  No complications were encountered    Procedure(s):  CIRCUMCISION  FULGURATION OF WARTS  GLANS BIOPSY  AZEVEDO CATHETER INSERTION    Surgeon(s):  Jorge Alberto Francis MD    Anesthesia: see anesthesia record    Staff:   Circulator: Bianka Paredes RN; Wendy Cardenas RN  Scrub Person: Annita Woodson  Assistant: Annika Mittal LPN    Estimated Blood Loss: none  Urine Voided: * No values recorded between 3/11/2024  7:58 AM and 3/11/2024  8:34 AM *    Specimens:                ID Type Source Tests Collected by Time   A :  Tissue Foreskin TISSUE EXAM, [facility]) Jorge Alberto Francis MD 3/11/2024 0728   B : BIOPSY OF GLANDS PENIS Tissue Penis TISSUE EXAM, [facility] ([facility]) Jorge Alberto Francis MD 3/11/2024 0896         Drains: None    Findings: 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

## 2024-03-11 NOTE — ANESTHESIA POSTPROCEDURE EVALUATION
Patient: Stephenie Valdez Jr.    Procedure Summary       Date: 03/11/24 Room / Location:  [facility] 06 /  [facility]    Anesthesia Start: 0758 Anesthesia Stop: 0835    Procedure: CIRCUMCISION, FULGERATION OF WARTS, AND BIOPSY (Penis) Diagnosis:       Genital warts      Phimosis of penis      (Genital warts [A63.0])      (Phimosis of penis [N47.1])    Surgeons: Jorge Alberto Francis MD Provider: Mario Fuchs DO    Anesthesia Type: general ASA Status: 2            Anesthesia Type: general    Vitals  Vitals Value Taken Time   /53 03/11/24 0853   Temp 98.4 °F (36.9 °C) 03/11/24 0837   Pulse 61 03/11/24 0856   Resp 14 03/11/24 0852   SpO2 96 % 03/11/24 0856   Vitals shown include unfiled device data.        Post Anesthesia Care and Evaluation    Patient location during evaluation: PHASE II  Patient participation: complete - patient participated  Level of consciousness: awake and alert  Pain score: 1  Pain management: adequate    Airway patency: patent  Anesthetic complications: No anesthetic complications  PONV Status: controlled  Cardiovascular status: acceptable  Respiratory status: acceptable  Hydration status: acceptable

## 2024-03-12 ENCOUNTER — HOSPITAL ENCOUNTER (EMERGENCY)
Facility: HOSPITAL | Age: 89
Discharge: HOME | End: 2024-03-12
Attending: STUDENT IN AN ORGANIZED HEALTH CARE EDUCATION/TRAINING PROGRAM | Admitting: STUDENT IN AN ORGANIZED HEALTH CARE EDUCATION/TRAINING PROGRAM
Payer: MEDICARE

## 2024-03-12 VITALS
SYSTOLIC BLOOD PRESSURE: 139 MMHG | WEIGHT: 173 LBS | RESPIRATION RATE: 18 BRPM | OXYGEN SATURATION: 97 % | TEMPERATURE: 98.1 F | BODY MASS INDEX: 24.77 KG/M2 | HEART RATE: 71 BPM | HEIGHT: 70 IN | DIASTOLIC BLOOD PRESSURE: 70 MMHG

## 2024-03-12 DIAGNOSIS — T83.021A DISPLACEMENT OF FOLEY CATHETER, INITIAL ENCOUNTER: Primary | ICD-10-CM

## 2024-03-12 PROCEDURE — 99283 EMERGENCY DEPT VISIT LOW MDM: CPT

## 2024-03-12 PROCEDURE — 51702 INSERT TEMP BLADDER CATH: CPT

## 2024-03-12 RX ORDER — LIDOCAINE HYDROCHLORIDE 20 MG/ML
JELLY TOPICAL ONCE
Status: COMPLETED | OUTPATIENT
Start: 2024-03-12 | End: 2024-03-12

## 2024-03-12 RX ADMIN — LIDOCAINE HYDROCHLORIDE: 20 JELLY TOPICAL at 05:28

## 2024-03-12 NOTE — ED NOTES
MEDICAL SCREENING:    Reason for Visit: Complications with chronic Leos    Patient initially seen in triage.  The patient was advised further evaluation and diagnostic testing will be needed, some of the treatment and testing will be initiated in the lobby in order to begin the process.  The patient will be returned to the waiting area for the time being and possibly be re-assessed by a subsequent ED provider.  The patient will be brought back to the treatment area in as timely manner as possible.       Keven Fair, DO  03/12/24 0126

## 2024-03-12 NOTE — ED PROVIDER NOTES
Subjective   History of Present Illness  89-year-old male with chronic urinary retention and chronic Leos catheter presents to the ED after his catheter fell out today.  Patient states he is not sure how that happened.  He states he needs to use the bathroom though.  Patient had urology procedure yesterday with removal of several warts and biopsy for concern for penile cancer.    History provided by:  Patient      Review of Systems    Past Medical History:   Diagnosis Date    Atrial fibrillation     Dementia     Enlarged prostate     Leos catheter in place     GERD (gastroesophageal reflux disease)     [place] (hard of hearing)     Renal disorder     Sleep apnea        Allergies   Allergen Reactions    Penicillins Hives                     Past Surgical History:   Procedure Laterality Date    COLONOSCOPY         Family History   Problem Relation Age of Onset    No Known Problems Father     No Known Problems Mother        Social History     Socioeconomic History    Marital status:    Tobacco Use    Smoking status: Former    Smokeless tobacco: Never   Vaping Use    Vaping status: Never Used   Substance and Sexual Activity    Alcohol use: Never    Drug use: Never    Sexual activity: Defer           Objective   Physical Exam  Constitutional:       General: He is not in acute distress.     Appearance: He is not ill-appearing.   HENT:      Head: Normocephalic and atraumatic.   Eyes:      Conjunctiva/sclera: Conjunctivae normal.   Cardiovascular:      Rate and Rhythm: Normal rate and regular rhythm.   Pulmonary:      Effort: Pulmonary effort is normal.   Abdominal:      General: Abdomen is flat.      Palpations: Abdomen is soft.      Tenderness: There is no abdominal tenderness.   Musculoskeletal:      Right lower leg: No edema.      Left lower leg: No edema.   Neurological:      General: No focal deficit present.      Mental Status: He is alert and oriented to person, place, and time. Mental status is at baseline.          Procedures           ED Course                                             Medical Decision Making  89-year-old male presents to the ED after his Leos catheter fell out.  Patient in no acute distress with normal vitals on arrival.  Leos catheter was replaced.  450 mL of urine out.  Patient well-appearing afterwards.  Discharged in no acute distress.    Problems Addressed:  Displacement of Leos catheter, initial encounter: complicated acute illness or injury    Risk  Prescription drug management.        Final diagnoses:   Displacement of Leos catheter, initial encounter       ED Disposition  ED Disposition       ED Disposition   Discharge    Condition   Stable    Comment   --               Babar Marcus MD  [address] [facility]  [place] [facility] [address]  [phone]    Schedule an appointment as soon as possible for a visit   As needed    [facility] EMERGENCY DEPARTMENT  [address] [place] [id number] [phone]    If symptoms worsen         Medication List      No changes were made to your prescriptions during this visit.            Favio Naidu MD  03/12/24 0529

## 2024-03-14 ENCOUNTER — OFFICE VISIT (OUTPATIENT)
Dept: UROLOGY | Facility: CLINIC | Age: 89
End: 2024-03-14
Payer: MEDICARE

## 2024-03-14 VITALS
HEIGHT: 70 IN | BODY MASS INDEX: 25.05 KG/M2 | HEART RATE: 72 BPM | WEIGHT: 175 LBS | SYSTOLIC BLOOD PRESSURE: 125 MMHG | DIASTOLIC BLOOD PRESSURE: 74 MMHG

## 2024-03-14 DIAGNOSIS — Z46.6 URINARY CATHETER (FOLEY) CHANGE REQUIRED: ICD-10-CM

## 2024-03-14 DIAGNOSIS — R33.8 URINARY RETENTION DUE TO BENIGN PROSTATIC HYPERPLASIA: Primary | ICD-10-CM

## 2024-03-14 DIAGNOSIS — N48.1 BALANITIS: ICD-10-CM

## 2024-03-14 DIAGNOSIS — N40.1 URINARY RETENTION DUE TO BENIGN PROSTATIC HYPERPLASIA: Primary | ICD-10-CM

## 2024-03-14 DIAGNOSIS — N47.1 PHIMOSIS OF PENIS: ICD-10-CM

## 2024-03-14 LAB — REF LAB TEST METHOD: NORMAL

## 2024-03-14 PROCEDURE — 1160F RVW MEDS BY RX/DR IN RCRD: CPT | Performed by: UROLOGY

## 2024-03-14 PROCEDURE — 99213 OFFICE O/P EST LOW 20 MIN: CPT | Performed by: UROLOGY

## 2024-03-14 PROCEDURE — 1159F MED LIST DOCD IN RCRD: CPT | Performed by: UROLOGY

## 2024-03-14 NOTE — PROGRESS NOTES
"Chief Complaint:      Chief Complaint   Patient presents with    Post op Visit       HPI:   89 y.o. male status post a circumcision pathology report revealed \"premalignant area on the glans which I suspected the area been fulgurated the penis looks great the catheter fell out I had an replace at the emergency room overall, he is doing well I think he is at maximum treatment given his age and overall numerous comorbidities.    Past Medical History:     Past Medical History:   Diagnosis Date    Atrial fibrillation     Dementia     Enlarged prostate     Leos catheter in place     GERD (gastroesophageal reflux disease)     [place] (hard of hearing)     Renal disorder     Sleep apnea        Current Meds:     Current Outpatient Medications   Medication Sig Dispense Refill    clotrimazole-betamethasone (Lotrisone) 1-0.05 % cream Apply 1 application topically to the appropriate area as directed 2 (Two) Times a Day. 45 g 2    HYDROcodone-acetaminophen (NORCO) 5-325 MG per tablet Take 1-2 tablets by mouth Every 6 (Six) Hours As Needed (Pain). 12 tablet 0    imiquimod (ALDARA) 5 % cream Apply to warts 3 times weekly at bedtime.  Leave on for 6 to 10 hours. 12 each 1    warfarin (COUMADIN) 4 MG tablet Take 1 tablet by mouth Daily.       No current facility-administered medications for this visit.        Allergies:      Allergies   Allergen Reactions    Penicillins Hives                      Past Surgical History:     Past Surgical History:   Procedure Laterality Date    CIRCUMCISION N/A 3/11/2024    Procedure: CIRCUMCISION, FULGERATION OF WARTS, AND BIOPSY;  Surgeon: Jorge Alberto Francis MD;  Location: [facility];  Service: Urology;  Laterality: N/A;    COLONOSCOPY         Social History:     Social History     Socioeconomic History    Marital status:    Tobacco Use    Smoking status: Former    Smokeless tobacco: Never   Vaping Use    Vaping status: Never Used   Substance and Sexual Activity    Alcohol use: Never    Drug " use: Never    Sexual activity: Defer       Family History:     Family History   Problem Relation Age of Onset    No Known Problems Father     No Known Problems Mother        Review of Systems:     Review of Systems   Constitutional: Negative.    HENT: Negative.     Eyes: Negative.    Respiratory: Negative.     Cardiovascular: Negative.    Gastrointestinal: Negative.    Endocrine: Negative.    Genitourinary:  Positive for penile discharge, penile pain, penile swelling and scrotal swelling.   Musculoskeletal: Negative.    Allergic/Immunologic: Negative.    Neurological: Negative.    Hematological: Negative.    Psychiatric/Behavioral: Negative.         Physical Exam:     Physical Exam  Vitals and nursing note reviewed.   Constitutional:       Appearance: He is well-developed.   HENT:      Head: Normocephalic and atraumatic.   Eyes:      Conjunctiva/sclera: Conjunctivae normal.      Pupils: Pupils are equal, round, and reactive to light.   Cardiovascular:      Rate and Rhythm: Normal rate and regular rhythm.      Heart sounds: Normal heart sounds.   Pulmonary:      Effort: Pulmonary effort is normal.      Breath sounds: Normal breath sounds.   Abdominal:      General: Bowel sounds are normal.      Palpations: Abdomen is soft.   Genitourinary:     Comments: postcircumcision.  Fulguration area and cancer biopsy is healing.  I did fulgurated 1 area.  No erythema, induration or tenderness  Musculoskeletal:         General: Normal range of motion.      Cervical back: Normal range of motion.   Skin:     General: Skin is warm and dry.   Neurological:      Mental Status: He is alert and oriented to person, place, and time.      Deep Tendon Reflexes: Reflexes are normal and symmetric.   Psychiatric:         Behavior: Behavior normal.         Thought Content: Thought content normal.         Judgment: Judgment normal.         I have reviewed the following portions of the patient's history: Allergies, current medications, past  family history, past medical history, past social history, past surgical history, problem list, and ROS and confirm it is accurate.    Recent Image (CT and/or KUB):      CT Abdomen and Pelvis: No results found for this or any previous visit.       CT Stone Protocol: Results for orders placed during the hospital encounter of 07/26/22    CT Abdomen Pelvis Stone Protocol    Narrative  CT Abdomen Pelvis WO    INDICATION:  Hematuria    TECHNIQUE:  CT of the abdomen and pelvis without IV contrast. Coronal and sagittal reconstructions were obtained.  Radiation dose reduction techniques included automated exposure control or exposure modulation based on body size. Count of known CT and cardiac nuc  med studies performed in previous 12 months: 1.    COMPARISON:  6/30/2022    FINDINGS:  Abdomen: Multiple hepatic cysts are again seen, unchanged. Nonspecific low-density splenic lesions are again noted, also unchanged. The pancreas is unremarkable. There is a 2 to 3 cm duodenal diverticulum again noted. Bilateral hydronephrosis is seen and  this has increased since the previous examination. Both ureters are dilated down to the bladder but no ureteral stones are seen. The bladder is distended. There is a Leos catheter. The balloon is within the penile urethra. No evidence of retroperitoneal  adenopathy. No distended bowel loops.    Pelvis: Normal appendix. No adenopathy or pelvic mass.    Impression  The Leos catheter has slipped distally such that the balloon is in the penile urethra. The bladder is distended consistent with bladder outlet obstruction and there is bilateral hydronephrosis.          Signer Name: Ricky Arredondo MD  Signed: 7/26/2022 7:13 PM  Workstation Name: [id number]  [facility]       KUB: No results found for this or any previous visit.       Labs (past 3 months):      Pre-Admission Testing on 03/08/2024   Component Date Value Ref Range Status    Glucose 03/08/2024 76  65 - 99 mg/dL  Final    BUN 03/08/2024 16  8 - 23 mg/dL Final    Creatinine 03/08/2024 1.22  0.76 - 1.27 mg/dL Final    Sodium 03/08/2024 139  136 - 145 mmol/L Final    Potassium 03/08/2024 4.0  3.5 - 5.2 mmol/L Final    Chloride 03/08/2024 104  98 - 107 mmol/L Final    CO2 03/08/2024 25.1  22.0 - 29.0 mmol/L Final    Calcium 03/08/2024 9.0  8.2 - 9.6 mg/dL Final    BUN/Creatinine Ratio 03/08/2024 13.1  7.0 - 25.0 Final    Anion Gap 03/08/2024 9.9  5.0 - 15.0 mmol/L Final    eGFR 03/08/2024 56.0 (L)  >60.0 mL/min/1.73 Final    WBC 03/08/2024 8.11  3.40 - 10.80 10*3/mm3 Final    RBC 03/08/2024 4.92  4.14 - 5.80 10*6/mm3 Final    Hemoglobin 03/08/2024 14.2  13.0 - 17.7 g/dL Final    Hematocrit 03/08/2024 45.5  37.5 - 51.0 % Final    MCV 03/08/2024 92.5  79.0 - 97.0 fL Final    MCH 03/08/2024 28.9  26.6 - 33.0 pg Final    MCHC 03/08/2024 31.2 (L)  31.5 - 35.7 g/dL Final    RDW 03/08/2024 15.1  12.3 - 15.4 % Final    RDW-SD 03/08/2024 51.2  37.0 - 54.0 fl Final    MPV 03/08/2024 10.5  6.0 - 12.0 fL Final    Platelets 03/08/2024 164  140 - 450 10*3/mm3 Final    QT Interval 03/08/2024 372  ms Final    QTC Interval 03/08/2024 377  ms Final   Admission on 01/17/2024, Discharged on 01/17/2024   Component Date Value Ref Range Status    Glucose 01/17/2024 109 (H)  65 - 99 mg/dL Final    BUN 01/17/2024 20  8 - 23 mg/dL Final    Creatinine 01/17/2024 1.43 (H)  0.76 - 1.27 mg/dL Final    Sodium 01/17/2024 138  136 - 145 mmol/L Final    Potassium 01/17/2024 4.9  3.5 - 5.2 mmol/L Final    Chloride 01/17/2024 102  98 - 107 mmol/L Final    CO2 01/17/2024 26.9  22.0 - 29.0 mmol/L Final    Calcium 01/17/2024 9.3  8.2 - 9.6 mg/dL Final    Total Protein 01/17/2024 6.9  6.0 - 8.5 g/dL Final    Albumin 01/17/2024 3.8  3.5 - 5.2 g/dL Final    ALT (SGPT) 01/17/2024 13  1 - 41 U/L Final    AST (SGOT) 01/17/2024 16  1 - 40 U/L Final    Alkaline Phosphatase 01/17/2024 95  39 - 117 U/L Final    Total Bilirubin 01/17/2024 0.5  0.0 - 1.2 mg/dL Final    Globulin  01/17/2024 3.1  gm/dL Final    A/G Ratio 01/17/2024 1.2  g/dL Final    BUN/Creatinine Ratio 01/17/2024 14.0  7.0 - 25.0 Final    Anion Gap 01/17/2024 9.1  5.0 - 15.0 mmol/L Final    eGFR 01/17/2024 46.3 (L)  >60.0 mL/min/1.73 Final    WBC 01/17/2024 9.55  3.40 - 10.80 10*3/mm3 Final    RBC 01/17/2024 4.99  4.14 - 5.80 10*6/mm3 Final    Hemoglobin 01/17/2024 14.5  13.0 - 17.7 g/dL Final    Hematocrit 01/17/2024 45.8  37.5 - 51.0 % Final    MCV 01/17/2024 91.8  79.0 - 97.0 fL Final    MCH 01/17/2024 29.1  26.6 - 33.0 pg Final    MCHC 01/17/2024 31.7  31.5 - 35.7 g/dL Final    RDW 01/17/2024 15.2  12.3 - 15.4 % Final    RDW-SD 01/17/2024 50.5  37.0 - 54.0 fl Final    MPV 01/17/2024 9.6  6.0 - 12.0 fL Final    Platelets 01/17/2024 149  140 - 450 10*3/mm3 Final    Neutrophil % 01/17/2024 76.1 (H)  42.7 - 76.0 % Final    Lymphocyte % 01/17/2024 14.1 (L)  19.6 - 45.3 % Final    Monocyte % 01/17/2024 8.3  5.0 - 12.0 % Final    Eosinophil % 01/17/2024 0.7  0.3 - 6.2 % Final    Basophil % 01/17/2024 0.3  0.0 - 1.5 % Final    Immature Grans % 01/17/2024 0.5  0.0 - 0.5 % Final    Neutrophils, Absolute 01/17/2024 7.26 (H)  1.70 - 7.00 10*3/mm3 Final    Lymphocytes, Absolute 01/17/2024 1.35  0.70 - 3.10 10*3/mm3 Final    Monocytes, Absolute 01/17/2024 0.79  0.10 - 0.90 10*3/mm3 Final    Eosinophils, Absolute 01/17/2024 0.07  0.00 - 0.40 10*3/mm3 Final    Basophils, Absolute 01/17/2024 0.03  0.00 - 0.20 10*3/mm3 Final    Immature Grans, Absolute 01/17/2024 0.05  0.00 - 0.05 10*3/mm3 Final    nRBC 01/17/2024 0.0  0.0 - 0.2 /100 WBC Final    Extra Tube 01/17/2024 Hold for add-ons.   Final    Auto resulted.    Extra Tube 01/17/2024 hold for add-on   Final    Auto resulted    Extra Tube 01/17/2024 Hold for add-ons.   Final    Auto resulted.    Extra Tube 01/17/2024 Hold for add-ons.   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

## 2024-03-15 ENCOUNTER — TELEPHONE (OUTPATIENT)
Dept: UROLOGY | Facility: CLINIC | Age: 89
End: 2024-03-15
Payer: MEDICARE

## 2024-03-15 NOTE — TELEPHONE ENCOUNTER
The pt wife called and asked when he should go back on his blood thinners. I asked Doc and he said Sunday and I called back and let her know.

## 2024-04-01 ENCOUNTER — OFFICE VISIT (OUTPATIENT)
Dept: UROLOGY | Facility: CLINIC | Age: 89
End: 2024-04-01
Payer: MEDICARE

## 2024-04-01 VITALS
BODY MASS INDEX: 24.45 KG/M2 | SYSTOLIC BLOOD PRESSURE: 161 MMHG | HEART RATE: 71 BPM | WEIGHT: 170.8 LBS | HEIGHT: 70 IN | DIASTOLIC BLOOD PRESSURE: 77 MMHG

## 2024-04-01 DIAGNOSIS — N47.1 PHIMOSIS OF PENIS: ICD-10-CM

## 2024-04-01 DIAGNOSIS — A63.0 GENITAL WARTS: Primary | ICD-10-CM

## 2024-04-01 DIAGNOSIS — C60.9 PENILE CANCER: ICD-10-CM

## 2024-04-01 PROCEDURE — 1160F RVW MEDS BY RX/DR IN RCRD: CPT | Performed by: UROLOGY

## 2024-04-01 PROCEDURE — 1159F MED LIST DOCD IN RCRD: CPT | Performed by: UROLOGY

## 2024-04-01 PROCEDURE — 99213 OFFICE O/P EST LOW 20 MIN: CPT | Performed by: UROLOGY

## 2024-04-01 NOTE — PROGRESS NOTES
Chief Complaint:      Chief Complaint   Patient presents with    Urinary retention due to benign prostatic hyperplasia       HPI:   89 y.o. male with urinary retention he had severe phimosis secondary to condyloma acuminata path report being a precancerous condition on the glans.  I did removed it and fulgurated the area.  Given his advanced age I do think anything further is necessary but close observation as we change his catheter on a monthly basis.  I did discuss this with his wife we will see him back based on this.  Certainly if we see progression he will need more aggressive intervention.    Past Medical History:     Past Medical History:   Diagnosis Date    Atrial fibrillation     Dementia     Enlarged prostate     Leos catheter in place     GERD (gastroesophageal reflux disease)     [place] (hard of hearing)     Renal disorder     Sleep apnea        Current Meds:     Current Outpatient Medications   Medication Sig Dispense Refill    clotrimazole-betamethasone (Lotrisone) 1-0.05 % cream Apply 1 application topically to the appropriate area as directed 2 (Two) Times a Day. 45 g 2    HYDROcodone-acetaminophen (NORCO) 5-325 MG per tablet Take 1-2 tablets by mouth Every 6 (Six) Hours As Needed (Pain). 12 tablet 0    imiquimod (ALDARA) 5 % cream Apply to warts 3 times weekly at bedtime.  Leave on for 6 to 10 hours. 12 each 1    warfarin (COUMADIN) 4 MG tablet Take 1 tablet by mouth Daily.       No current facility-administered medications for this visit.        Allergies:      Allergies   Allergen Reactions    Penicillins Hives                      Past Surgical History:     Past Surgical History:   Procedure Laterality Date    CIRCUMCISION N/A 3/11/2024    Procedure: CIRCUMCISION, FULGERATION OF WARTS, AND BIOPSY;  Surgeon: Jorge Alberto Francis MD;  Location: [facility];  Service: Urology;  Laterality: N/A;    COLONOSCOPY         Social History:     Social History     Socioeconomic History    Marital status:     Tobacco Use    Smoking status: Former    Smokeless tobacco: Never   Vaping Use    Vaping status: Never Used   Substance and Sexual Activity    Alcohol use: Never    Drug use: Never    Sexual activity: Defer       Family History:     Family History   Problem Relation Age of Onset    No Known Problems Father     No Known Problems Mother        Review of Systems:     Review of Systems   Constitutional: Negative.    HENT: Negative.     Eyes: Negative.    Respiratory: Negative.     Cardiovascular: Negative.    Gastrointestinal: Negative.    Endocrine: Negative.    Genitourinary:  Positive for difficulty urinating, penile discharge, penile pain and penile swelling.   Musculoskeletal: Negative.    Allergic/Immunologic: Negative.    Neurological: Negative.    Hematological: Negative.    Psychiatric/Behavioral: Negative.         Physical Exam:     Physical Exam  Vitals and nursing note reviewed.   Constitutional:       Appearance: He is well-developed.   HENT:      Head: Normocephalic and atraumatic.   Eyes:      Conjunctiva/sclera: Conjunctivae normal.      Pupils: Pupils are equal, round, and reactive to light.   Cardiovascular:      Rate and Rhythm: Normal rate and regular rhythm.      Heart sounds: Normal heart sounds.   Pulmonary:      Effort: Pulmonary effort is normal.      Breath sounds: Normal breath sounds.   Abdominal:      General: Bowel sounds are normal.      Palpations: Abdomen is soft.   Genitourinary:     Comments: Healing circumcision site no obvious areas of penile cancer extension recommend continued close follow-up  Musculoskeletal:         General: Normal range of motion.      Cervical back: Normal range of motion.   Skin:     General: Skin is warm and dry.   Neurological:      Mental Status: He is alert and oriented to person, place, and time.      Deep Tendon Reflexes: Reflexes are normal and symmetric.   Psychiatric:         Behavior: Behavior normal.         Thought Content: Thought  content normal.         Judgment: Judgment normal.         I have reviewed the following portions of the patient's history: Allergies, current medications, past family history, past medical history, past social history, past surgical history, problem list, and ROS and confirm it is accurate.    Recent Image (CT and/or KUB):      CT Abdomen and Pelvis: No results found for this or any previous visit.       CT Stone Protocol: Results for orders placed during the hospital encounter of 07/26/22    CT Abdomen Pelvis Stone Protocol    Narrative  CT Abdomen Pelvis WO    INDICATION:  Hematuria    TECHNIQUE:  CT of the abdomen and pelvis without IV contrast. Coronal and sagittal reconstructions were obtained.  Radiation dose reduction techniques included automated exposure control or exposure modulation based on body size. Count of known CT and cardiac nuc  med studies performed in previous 12 months: 1.    COMPARISON:  6/30/2022    FINDINGS:  Abdomen: Multiple hepatic cysts are again seen, unchanged. Nonspecific low-density splenic lesions are again noted, also unchanged. The pancreas is unremarkable. There is a 2 to 3 cm duodenal diverticulum again noted. Bilateral hydronephrosis is seen and  this has increased since the previous examination. Both ureters are dilated down to the bladder but no ureteral stones are seen. The bladder is distended. There is a Leos catheter. The balloon is within the penile urethra. No evidence of retroperitoneal  adenopathy. No distended bowel loops.    Pelvis: Normal appendix. No adenopathy or pelvic mass.    Impression  The Leos catheter has slipped distally such that the balloon is in the penile urethra. The bladder is distended consistent with bladder outlet obstruction and there is bilateral hydronephrosis.          Signer Name: Ricky Arredondo MD  Signed: 7/26/2022 7:13 PM  Workstation Name: [id number]  [facility]       KUB: No results found for this or any  previous visit.       Labs (past 3 months):      Admission on 2024, Discharged on 2024   Component Date Value Ref Range Status    Reference Lab Report 2024    Final                    Value:[facility]  [address] [place]  [address]  Phone: [phone] or [phone]  Fax: [phone]  Haroldo Mandujano M.D., Medical Director    PATIENT NAME                           LABORATORY NO.  WILLY HENDERSON JR                       [id number]  [id number]                         AGE              SEX  SSN           CLIENT REF #  [facility] [place]              89      1932      [id number]   [id number]    [address]                     REQUESTING M.D.     ATTENDING [place] [address]                   REGGIE SMITH  DATE COLLECTED      DATE RECEIVED      DATE REPORTED  2024    DIAGNOSIS:  A.   FORESKIN:  Multifocal condyloma with chronic inflammation, see comment  No high-grade dysplasia or malignancy identified  B.   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  Immunohistochemical stains with appropriate controls are performed on blocks  A1 (p16, foreskin) and B1 (p16 and Ki-67, penile biopsy) for further evaluation.  The foreskin displays patchy p16 staining without block-like reactivity in areas  of condyloma, providing no evidence of high-grade dysplasia in that specimen.  The penile biopsy shows focal block-like reactivity for p16 with corresponding  increase in Ki-67 proliferation rate, supporting focal high-grade squamous  intraepithelial lesion,  "approaching squamous cell carcinoma in situ.  Background  condyloma in the penile biopsy shows patchy reactivity for p16.    CLINICAL HISTORY:  Genital warts, phimosis of penis    SPECIMENS RECEIVED:  A.  FORESKIN  B.  PENIS , BIOPSY OF GLANS    MICROSCOPIC DESCRIPTION:  Tissue blocks are                           prepared and slides are examined microscopically on all  specimens. See diagnosis for details.    The internal and external (both positive and negative) controls reacted  appropriately. Some of our immunohistochemical and in situ hybridization  studies are performed as analyte specific reagents. The following statement  applies to those tests: This test was developed, and its performance  characteristics determined by [facility]. It has not been  cleared or approved by the [place] Food and Drug Administration. However, the  FDA has determined that approval and clearance are not necessary.    Professional interpretation rendered by Sharon Cabrera M.D., ANABEL.A.P. at  [employer], [address] [place], [place] [address].    GROSS DESCRIPTION:  A.  Received in formalin labeled \"foreskin\" is a 7.1 x 4.0 x 1.0 cm excision of  tan-pink wrinkled skin with underlying gray-pink edematous soft tissue.  Several gray-pink, verrucous, and raised lesions are noted throughout the  foreskin                           ranging in size from 0.3 x 0.3 x 0.2 (depth) cm to 1.7 x 0.8 x 0.3  (depth) cm and grossly located within 0.1-1.5 cm of the nearest surgical  margin which is inked blue.  Representative sections are submitted in 2  blocks.  [facility]  Remainder of tissue submitted in blocks A3-8 on 03/13/2024. [facility]  B.  Labeled \"biopsy of glans penis\" are 2 portions of gray-tan soft tissue  measuring 0.9 x 0.7 x 0.3 cm in aggregate and submitted entirely in 1 block  as received.  [facility]    REVIEWED, DIAGNOSED AND ELECTRONICALLY  SIGNED BY:    Sharon Cabrera M.D., F.C.A.P.  CPT CODES:  13382, 88342x2, 07867, 47116, 49905   "   Pre-Admission Testing on 03/08/2024   Component Date Value Ref Range Status    Glucose 03/08/2024 76  65 - 99 mg/dL Final    BUN 03/08/2024 16  8 - 23 mg/dL Final    Creatinine 03/08/2024 1.22  0.76 - 1.27 mg/dL Final    Sodium 03/08/2024 139  136 - 145 mmol/L Final    Potassium 03/08/2024 4.0  3.5 - 5.2 mmol/L Final    Chloride 03/08/2024 104  98 - 107 mmol/L Final    CO2 03/08/2024 25.1  22.0 - 29.0 mmol/L Final    Calcium 03/08/2024 9.0  8.2 - 9.6 mg/dL Final    BUN/Creatinine Ratio 03/08/2024 13.1  7.0 - 25.0 Final    Anion Gap 03/08/2024 9.9  5.0 - 15.0 mmol/L Final    eGFR 03/08/2024 56.0 (L)  >60.0 mL/min/1.73 Final    WBC 03/08/2024 8.11  3.40 - 10.80 10*3/mm3 Final    RBC 03/08/2024 4.92  4.14 - 5.80 10*6/mm3 Final    Hemoglobin 03/08/2024 14.2  13.0 - 17.7 g/dL Final    Hematocrit 03/08/2024 45.5  37.5 - 51.0 % Final    MCV 03/08/2024 92.5  79.0 - 97.0 fL Final    MCH 03/08/2024 28.9  26.6 - 33.0 pg Final    MCHC 03/08/2024 31.2 (L)  31.5 - 35.7 g/dL Final    RDW 03/08/2024 15.1  12.3 - 15.4 % Final    RDW-SD 03/08/2024 51.2  37.0 - 54.0 fl Final    MPV 03/08/2024 10.5  6.0 - 12.0 fL Final    Platelets 03/08/2024 164  140 - 450 10*3/mm3 Final    QT Interval 03/08/2024 372  ms Final    QTC Interval 03/08/2024 377  ms Final   Admission on 01/17/2024, Discharged on 01/17/2024   Component Date Value Ref Range Status    Glucose 01/17/2024 109 (H)  65 - 99 mg/dL Final    BUN 01/17/2024 20  8 - 23 mg/dL Final    Creatinine 01/17/2024 1.43 (H)  0.76 - 1.27 mg/dL Final    Sodium 01/17/2024 138  136 - 145 mmol/L Final    Potassium 01/17/2024 4.9  3.5 - 5.2 mmol/L Final    Chloride 01/17/2024 102  98 - 107 mmol/L Final    CO2 01/17/2024 26.9  22.0 - 29.0 mmol/L Final    Calcium 01/17/2024 9.3  8.2 - 9.6 mg/dL Final    Total Protein 01/17/2024 6.9  6.0 - 8.5 g/dL Final    Albumin 01/17/2024 3.8  3.5 - 5.2 g/dL Final    ALT (SGPT) 01/17/2024 13  1 - 41 U/L Final    AST (SGOT) 01/17/2024 16  1 - 40 U/L Final     Alkaline Phosphatase 01/17/2024 95  39 - 117 U/L Final    Total Bilirubin 01/17/2024 0.5  0.0 - 1.2 mg/dL Final    Globulin 01/17/2024 3.1  gm/dL Final    A/G Ratio 01/17/2024 1.2  g/dL Final    BUN/Creatinine Ratio 01/17/2024 14.0  7.0 - 25.0 Final    Anion Gap 01/17/2024 9.1  5.0 - 15.0 mmol/L Final    eGFR 01/17/2024 46.3 (L)  >60.0 mL/min/1.73 Final    WBC 01/17/2024 9.55  3.40 - 10.80 10*3/mm3 Final    RBC 01/17/2024 4.99  4.14 - 5.80 10*6/mm3 Final    Hemoglobin 01/17/2024 14.5  13.0 - 17.7 g/dL Final    Hematocrit 01/17/2024 45.8  37.5 - 51.0 % Final    MCV 01/17/2024 91.8  79.0 - 97.0 fL Final    MCH 01/17/2024 29.1  26.6 - 33.0 pg Final    MCHC 01/17/2024 31.7  31.5 - 35.7 g/dL Final    RDW 01/17/2024 15.2  12.3 - 15.4 % Final    RDW-SD 01/17/2024 50.5  37.0 - 54.0 fl Final    MPV 01/17/2024 9.6  6.0 - 12.0 fL Final    Platelets 01/17/2024 149  140 - 450 10*3/mm3 Final    Neutrophil % 01/17/2024 76.1 (H)  42.7 - 76.0 % Final    Lymphocyte % 01/17/2024 14.1 (L)  19.6 - 45.3 % Final    Monocyte % 01/17/2024 8.3  5.0 - 12.0 % Final    Eosinophil % 01/17/2024 0.7  0.3 - 6.2 % Final    Basophil % 01/17/2024 0.3  0.0 - 1.5 % Final    Immature Grans % 01/17/2024 0.5  0.0 - 0.5 % Final    Neutrophils, Absolute 01/17/2024 7.26 (H)  1.70 - 7.00 10*3/mm3 Final    Lymphocytes, Absolute 01/17/2024 1.35  0.70 - 3.10 10*3/mm3 Final    Monocytes, Absolute 01/17/2024 0.79  0.10 - 0.90 10*3/mm3 Final    Eosinophils, Absolute 01/17/2024 0.07  0.00 - 0.40 10*3/mm3 Final    Basophils, Absolute 01/17/2024 0.03  0.00 - 0.20 10*3/mm3 Final    Immature Grans, Absolute 01/17/2024 0.05  0.00 - 0.05 10*3/mm3 Final    nRBC 01/17/2024 0.0  0.0 - 0.2 /100 WBC Final    Extra Tube 01/17/2024 Hold for add-ons.   Final    Auto resulted.    Extra Tube 01/17/2024 hold for add-on   Final    Auto resulted    Extra Tube 01/17/2024 Hold for add-ons.   Final    Auto resulted.    Extra Tube 01/17/2024 Hold for add-ons.   Final    Auto resulted         Procedure:       Assessment/Plan:   In situ penile cancer status post circumcision he is not even an anesthetic candidate the circumcision was done under local.  This is in situ there is no obvious residual cancer he gets monthly changes of his catheter and will be examined on a monthly basis certainly if it changes or becomes more aggressive appearing a distal penectomy would be indicated but for now I recommend observation            This document has been electronically signed by REGGIE SMITH MD April 1, 2024 13:05 EDT    Dictated Utilizing Dragon Dictation: Part of this note may be an electronic transcription/translation of spoken language to printed text using the Dragon Dictation System.

## 2024-04-03 PROBLEM — C60.9 PENILE CANCER: Status: ACTIVE | Noted: 2024-04-03

## 2024-04-25 ENCOUNTER — HOSPITAL ENCOUNTER (EMERGENCY)
Facility: HOSPITAL | Age: 89
Discharge: HOME | End: 2024-04-25
Attending: EMERGENCY MEDICINE
Payer: MEDICARE

## 2024-04-25 ENCOUNTER — APPOINTMENT (OUTPATIENT)
Dept: ULTRASOUND IMAGING | Facility: HOSPITAL | Age: 89
End: 2024-04-25
Payer: MEDICARE

## 2024-04-25 ENCOUNTER — TELEPHONE (OUTPATIENT)
Dept: GENERAL RADIOLOGY | Facility: HOSPITAL | Age: 89
End: 2024-04-25
Payer: MEDICARE

## 2024-04-25 VITALS
BODY MASS INDEX: 24.46 KG/M2 | SYSTOLIC BLOOD PRESSURE: 128 MMHG | HEIGHT: 70 IN | HEART RATE: 61 BPM | RESPIRATION RATE: 20 BRPM | WEIGHT: 170.86 LBS | OXYGEN SATURATION: 98 % | DIASTOLIC BLOOD PRESSURE: 64 MMHG | TEMPERATURE: 98.1 F

## 2024-04-25 DIAGNOSIS — I82.5Z1 CHRONIC DEEP VEIN THROMBOSIS (DVT) OF DISTAL VEIN OF RIGHT LOWER EXTREMITY: Primary | ICD-10-CM

## 2024-04-25 LAB
ALBUMIN SERPL-MCNC: 3.7 G/DL (ref 3.5–5.2)
ALBUMIN/GLOB SERPL: 1.2 G/DL
ALP SERPL-CCNC: 112 U/L (ref 39–117)
ALT SERPL W P-5'-P-CCNC: 12 U/L (ref 1–41)
ANION GAP SERPL CALCULATED.3IONS-SCNC: 8.6 MMOL/L (ref 5–15)
APTT PPP: 41.6 SECONDS (ref 26.5–34.5)
AST SERPL-CCNC: 18 U/L (ref 1–40)
BASOPHILS # BLD AUTO: 0.05 10*3/MM3 (ref 0–0.2)
BASOPHILS NFR BLD AUTO: 0.6 % (ref 0–1.5)
BILIRUB SERPL-MCNC: 0.3 MG/DL (ref 0–1.2)
BUN SERPL-MCNC: 18 MG/DL (ref 8–23)
BUN/CREAT SERPL: 15 (ref 7–25)
CALCIUM SPEC-SCNC: 9.1 MG/DL (ref 8.2–9.6)
CHLORIDE SERPL-SCNC: 103 MMOL/L (ref 98–107)
CO2 SERPL-SCNC: 26.4 MMOL/L (ref 22–29)
CREAT SERPL-MCNC: 1.2 MG/DL (ref 0.76–1.27)
DEPRECATED RDW RBC AUTO: 50.4 FL (ref 37–54)
EGFRCR SERPLBLD CKD-EPI 2021: 57.1 ML/MIN/1.73
EOSINOPHIL # BLD AUTO: 0.42 10*3/MM3 (ref 0–0.4)
EOSINOPHIL NFR BLD AUTO: 5 % (ref 0.3–6.2)
ERYTHROCYTE [DISTWIDTH] IN BLOOD BY AUTOMATED COUNT: 15.1 % (ref 12.3–15.4)
GLOBULIN UR ELPH-MCNC: 3.2 GM/DL
GLUCOSE SERPL-MCNC: 98 MG/DL (ref 65–99)
HCT VFR BLD AUTO: 46.5 % (ref 37.5–51)
HGB BLD-MCNC: 14.7 G/DL (ref 13–17.7)
HOLD SPECIMEN: NORMAL
HOLD SPECIMEN: NORMAL
IMM GRANULOCYTES # BLD AUTO: 0.04 10*3/MM3 (ref 0–0.05)
IMM GRANULOCYTES NFR BLD AUTO: 0.5 % (ref 0–0.5)
INR PPP: 2.94 (ref 0.9–1.1)
LYMPHOCYTES # BLD AUTO: 2.13 10*3/MM3 (ref 0.7–3.1)
LYMPHOCYTES NFR BLD AUTO: 25.1 % (ref 19.6–45.3)
MCH RBC QN AUTO: 29.1 PG (ref 26.6–33)
MCHC RBC AUTO-ENTMCNC: 31.6 G/DL (ref 31.5–35.7)
MCV RBC AUTO: 92.1 FL (ref 79–97)
MONOCYTES # BLD AUTO: 0.7 10*3/MM3 (ref 0.1–0.9)
MONOCYTES NFR BLD AUTO: 8.3 % (ref 5–12)
NEUTROPHILS NFR BLD AUTO: 5.14 10*3/MM3 (ref 1.7–7)
NEUTROPHILS NFR BLD AUTO: 60.5 % (ref 42.7–76)
NRBC BLD AUTO-RTO: 0 /100 WBC (ref 0–0.2)
PLATELET # BLD AUTO: 146 10*3/MM3 (ref 140–450)
PMV BLD AUTO: 10.2 FL (ref 6–12)
POTASSIUM SERPL-SCNC: 4.5 MMOL/L (ref 3.5–5.2)
PROT SERPL-MCNC: 6.9 G/DL (ref 6–8.5)
PROTHROMBIN TIME: 31.4 SECONDS (ref 12.1–14.7)
QT INTERVAL: 386 MS
QTC INTERVAL: 395 MS
RBC # BLD AUTO: 5.05 10*6/MM3 (ref 4.14–5.8)
SODIUM SERPL-SCNC: 138 MMOL/L (ref 136–145)
WBC NRBC COR # BLD AUTO: 8.48 10*3/MM3 (ref 3.4–10.8)
WHOLE BLOOD HOLD COAG: NORMAL
WHOLE BLOOD HOLD SPECIMEN: NORMAL

## 2024-04-25 PROCEDURE — 36415 COLL VENOUS BLD VENIPUNCTURE: CPT

## 2024-04-25 PROCEDURE — 93971 EXTREMITY STUDY: CPT | Performed by: RADIOLOGY

## 2024-04-25 PROCEDURE — 85025 COMPLETE CBC W/AUTO DIFF WBC: CPT | Performed by: PHYSICIAN ASSISTANT

## 2024-04-25 PROCEDURE — 85730 THROMBOPLASTIN TIME PARTIAL: CPT | Performed by: PHYSICIAN ASSISTANT

## 2024-04-25 PROCEDURE — 80053 COMPREHEN METABOLIC PANEL: CPT | Performed by: PHYSICIAN ASSISTANT

## 2024-04-25 PROCEDURE — 93010 ELECTROCARDIOGRAM REPORT: CPT | Performed by: INTERNAL MEDICINE

## 2024-04-25 PROCEDURE — 93971 EXTREMITY STUDY: CPT

## 2024-04-25 PROCEDURE — 99284 EMERGENCY DEPT VISIT MOD MDM: CPT

## 2024-04-25 PROCEDURE — 93005 ELECTROCARDIOGRAM TRACING: CPT | Performed by: NURSE PRACTITIONER

## 2024-04-25 PROCEDURE — 85610 PROTHROMBIN TIME: CPT | Performed by: PHYSICIAN ASSISTANT

## 2024-04-25 NOTE — ED PROVIDER NOTES
Subjective     Leg Pain      Review of Systems    Past Medical History:   Diagnosis Date    Atrial fibrillation     Dementia     Enlarged prostate     Leos catheter in place     GERD (gastroesophageal reflux disease)     [place] (hard of hearing)     Renal disorder     Sleep apnea        Allergies   Allergen Reactions    Penicillins Hives                     Past Surgical History:   Procedure Laterality Date    CIRCUMCISION N/A 3/11/2024    Procedure: CIRCUMCISION, FULGERATION OF WARTS, AND BIOPSY;  Surgeon: Jorge Alberto Francis MD;  Location: [facility];  Service: Urology;  Laterality: N/A;    COLONOSCOPY         Family History   Problem Relation Age of Onset    No Known Problems Father     No Known Problems Mother        Social History     Socioeconomic History    Marital status:    Tobacco Use    Smoking status: Former    Smokeless tobacco: Never   Vaping Use    Vaping status: Never Used   Substance and Sexual Activity    Alcohol use: Never    Drug use: Never    Sexual activity: Defer           Objective   Physical Exam    Procedures           ED Course                                             Medical Decision Making  Amount and/or Complexity of Data Reviewed  Labs: ordered.        Final diagnoses:   None       ED Disposition  ED Disposition       None            No follow-up provider specified.       Medication List      No changes were made to your prescriptions during this visit.          well-developed.   HENT:      Head: Normocephalic.      Right Ear: External ear normal.      Left Ear: External ear normal.   Eyes:      Conjunctiva/sclera: Conjunctivae normal.      Pupils: Pupils are equal, round, and reactive to light.   Cardiovascular:      Rate and Rhythm: Normal rate and regular rhythm.      Heart sounds: Normal heart sounds.   Pulmonary:      Effort: Pulmonary effort is normal.      Breath sounds: Normal breath sounds.   Abdominal:      General: Bowel sounds are normal.      Palpations: Abdomen is soft.   Musculoskeletal:         General: Normal range of motion.      Cervical back: Normal range of motion and neck supple.      Right lower leg: Edema present.   Skin:     General: Skin is warm and dry.      Capillary Refill: Capillary refill takes less than 2 seconds.   Neurological:      Mental Status: He is alert and oriented to person, place, and time.   Psychiatric:         Behavior: Behavior normal.         Thought Content: Thought content normal.         Procedures           ED Course                                             Medical Decision Making  Problems Addressed:  Chronic deep vein thrombosis (DVT) of distal vein of right lower extremity: complicated acute illness or injury    Amount and/or Complexity of Data Reviewed  Labs: ordered.  ECG/medicine tests: ordered.        Final diagnoses:   Chronic deep vein thrombosis (DVT) of distal vein of right lower extremity       ED Disposition  ED Disposition       ED Disposition   Discharge    Condition   Stable    Comment   --               Babar Marcus MD  [address] [email or web address] [place] [address] [phone]    Schedule an appointment as soon as possible for a visit   For further evaluation         Medication List      No changes were made to your prescriptions during this visit.            Ry Lira, APRN  05/23/24 [id number]

## 2024-05-01 ENCOUNTER — OFFICE VISIT (OUTPATIENT)
Dept: UROLOGY | Facility: CLINIC | Age: 89
End: 2024-05-01
Payer: MEDICARE

## 2024-05-01 VITALS
SYSTOLIC BLOOD PRESSURE: 150 MMHG | WEIGHT: 172.2 LBS | DIASTOLIC BLOOD PRESSURE: 73 MMHG | HEART RATE: 69 BPM | BODY MASS INDEX: 24.65 KG/M2 | HEIGHT: 70 IN

## 2024-05-01 DIAGNOSIS — Z46.6 URINARY CATHETER (FOLEY) CHANGE REQUIRED: ICD-10-CM

## 2024-05-01 DIAGNOSIS — N40.1 URINARY RETENTION DUE TO BENIGN PROSTATIC HYPERPLASIA: Primary | ICD-10-CM

## 2024-05-01 DIAGNOSIS — R33.8 URINARY RETENTION DUE TO BENIGN PROSTATIC HYPERPLASIA: Primary | ICD-10-CM

## 2024-05-01 NOTE — PROGRESS NOTES
Chief Complaint:    Chief Complaint   Patient presents with    Urinary catheter (Leos) change required       Vital Signs:   There were no vitals taken for this visit.  There is no height or weight on file to calculate BMI.      HPI:  Stephenie Valdez Jr. is a 89 y.o. male who presents today for follow up    History of Present Illness  Mr. Valdez presents to the clinic for follow-up for BPH with urinary retention as well as phimosis and genital warts postcircumcision by Dr. Francis on 3/11/2024.  Pathology report did show malignant growth.  He has healed well from previous circumcision denies any complications.  Reports he had good urinary output through his catheter.  Physical exam today reveals well-healed surgical incisions from prior circumcision.  18 French indwelling Leos catheter with yellow urine noted in collection tubing.  I did go ahead and exchange his 18 French coudé catheter and he tolerated procedure well.  Will see him back in a month for repeat cath change.      Past Medical History:  Past Medical History:   Diagnosis Date    Atrial fibrillation     Dementia     Enlarged prostate     Leos catheter in place     GERD (gastroesophageal reflux disease)     [place] (hard of hearing)     Renal disorder     Sleep apnea        Current Meds:  Current Outpatient Medications   Medication Sig Dispense Refill    clotrimazole-betamethasone (Lotrisone) 1-0.05 % cream Apply 1 application topically to the appropriate area as directed 2 (Two) Times a Day. 45 g 2    HYDROcodone-acetaminophen (NORCO) 5-325 MG per tablet Take 1-2 tablets by mouth Every 6 (Six) Hours As Needed (Pain). 12 tablet 0    imiquimod (ALDARA) 5 % cream Apply to warts 3 times weekly at bedtime.  Leave on for 6 to 10 hours. 12 each 1    warfarin (COUMADIN) 4 MG tablet Take 1 tablet by mouth Daily.       No current facility-administered medications for this visit.        Allergies:   Allergies   Allergen Reactions    Penicillins Hives                       Past Surgical History:  Past Surgical History:   Procedure Laterality Date    CIRCUMCISION N/A 3/11/2024    Procedure: CIRCUMCISION, FULGERATION OF WARTS, AND BIOPSY;  Surgeon: Jorge Alberto Francis MD;  Location: [facility];  Service: Urology;  Laterality: N/A;    COLONOSCOPY         Social History:  Social History     Socioeconomic History    Marital status:    Tobacco Use    Smoking status: Former    Smokeless tobacco: Never   Vaping Use    Vaping status: Never Used   Substance and Sexual Activity    Alcohol use: Never    Drug use: Never    Sexual activity: Defer       Family History:  Family History   Problem Relation Age of Onset    No Known Problems Father     No Known Problems Mother        Review of Systems:  Review of Systems   Constitutional:  Negative for fatigue, fever and unexpected weight change.   Respiratory:  Negative for chest tightness and shortness of breath.    Cardiovascular:  Negative for chest pain.   Gastrointestinal:  Negative for abdominal pain, constipation, diarrhea, nausea and vomiting.   Genitourinary:  Positive for difficulty urinating. Negative for dysuria, frequency, penile discharge, penile pain, penile swelling and urgency.   Skin:  Negative for rash.   Psychiatric/Behavioral:  Negative for confusion and suicidal ideas.        Physical Exam:  Physical Exam  Constitutional:       General: He is not in acute distress.     Appearance: Normal appearance.   HENT:      Head: Normocephalic and atraumatic.      Nose: Nose normal.      Mouth/Throat:      Mouth: Mucous membranes are moist.   Eyes:      Conjunctiva/sclera: Conjunctivae normal.   Cardiovascular:      Rate and Rhythm: Normal rate and regular rhythm.      Pulses: Normal pulses.      Heart sounds: Normal heart sounds.   Pulmonary:      Effort: Pulmonary effort is normal.      Breath sounds: Normal breath sounds.   Abdominal:      General: Bowel sounds are normal.      Palpations: Abdomen is soft.   Genitourinary:      Comments: Well-healing circumcision.  Musculoskeletal:         General: Normal range of motion.      Cervical back: Normal range of motion.   Skin:     General: Skin is warm.   Neurological:      General: No focal deficit present.      Mental Status: He is alert and oriented to person, place, and time.   Psychiatric:         Mood and Affect: Mood normal.         Behavior: Behavior normal.         Thought Content: Thought content normal.         Judgment: Judgment normal.             Recent Image (CT and/or KUB):   CT Abdomen and Pelvis: No results found for this or any previous visit.     CT Stone Protocol: Results for orders placed during the hospital encounter of 07/26/22    CT Abdomen Pelvis Stone Protocol    Narrative  CT Abdomen Pelvis WO    INDICATION:  Hematuria    TECHNIQUE:  CT of the abdomen and pelvis without IV contrast. Coronal and sagittal reconstructions were obtained.  Radiation dose reduction techniques included automated exposure control or exposure modulation based on body size. Count of known CT and cardiac nuc  med studies performed in previous 12 months: 1.    COMPARISON:  6/30/2022    FINDINGS:  Abdomen: Multiple hepatic cysts are again seen, unchanged. Nonspecific low-density splenic lesions are again noted, also unchanged. The pancreas is unremarkable. There is a 2 to 3 cm duodenal diverticulum again noted. Bilateral hydronephrosis is seen and  this has increased since the previous examination. Both ureters are dilated down to the bladder but no ureteral stones are seen. The bladder is distended. There is a Leos catheter. The balloon is within the penile urethra. No evidence of retroperitoneal  adenopathy. No distended bowel loops.    Pelvis: Normal appendix. No adenopathy or pelvic mass.    Impression  The Leos catheter has slipped distally such that the balloon is in the penile urethra. The bladder is distended consistent with bladder outlet obstruction and there is bilateral  hydronephrosis.          Signer Name: Ricky Arredondo MD  Signed: 7/26/2022 7:13 PM  Workstation Name: [id number]  [facility]: No results found for this or any previous visit.       Labs:  Brief Urine Lab Results  (Last result in the past 365 days)        Color   Clarity   Blood   Leuk Est   Nitrite   Protein   CREAT   Urine HCG        10/16/23 1957 Yellow   Clear   Small (1+)   Large (3+)   Positive   Negative                 Admission on 04/25/2024, Discharged on 04/25/2024   Component Date Value Ref Range Status    Glucose 04/25/2024 98  65 - 99 mg/dL Final    BUN 04/25/2024 18  8 - 23 mg/dL Final    Creatinine 04/25/2024 1.20  0.76 - 1.27 mg/dL Final    Sodium 04/25/2024 138  136 - 145 mmol/L Final    Potassium 04/25/2024 4.5  3.5 - 5.2 mmol/L Final    Chloride 04/25/2024 103  98 - 107 mmol/L Final    CO2 04/25/2024 26.4  22.0 - 29.0 mmol/L Final    Calcium 04/25/2024 9.1  8.2 - 9.6 mg/dL Final    Total Protein 04/25/2024 6.9  6.0 - 8.5 g/dL Final    Albumin 04/25/2024 3.7  3.5 - 5.2 g/dL Final    ALT (SGPT) 04/25/2024 12  1 - 41 U/L Final    AST (SGOT) 04/25/2024 18  1 - 40 U/L Final    Alkaline Phosphatase 04/25/2024 112  39 - 117 U/L Final    Total Bilirubin 04/25/2024 0.3  0.0 - 1.2 mg/dL Final    Globulin 04/25/2024 3.2  gm/dL Final    A/G Ratio 04/25/2024 1.2  g/dL Final    BUN/Creatinine Ratio 04/25/2024 15.0  7.0 - 25.0 Final    Anion Gap 04/25/2024 8.6  5.0 - 15.0 mmol/L Final    eGFR 04/25/2024 57.1 (L)  >60.0 mL/min/1.73 Final    PTT 04/25/2024 41.6 (H)  26.5 - 34.5 seconds Final    Protime 04/25/2024 31.4 (H)  12.1 - 14.7 Seconds Final    INR 04/25/2024 2.94 (H)  0.90 - 1.10 Final    WBC 04/25/2024 8.48  3.40 - 10.80 10*3/mm3 Final    RBC 04/25/2024 5.05  4.14 - 5.80 10*6/mm3 Final    Hemoglobin 04/25/2024 14.7  13.0 - 17.7 g/dL Final    Hematocrit 04/25/2024 46.5  37.5 - 51.0 % Final    MCV 04/25/2024 92.1  79.0 - 97.0 fL Final    MCH 04/25/2024 29.1  26.6 - 33.0 pg  Final    MCHC 2024 31.6  31.5 - 35.7 g/dL Final    RDW 2024 15.1  12.3 - 15.4 % Final    RDW-SD 2024 50.4  37.0 - 54.0 fl Final    MPV 2024 10.2  6.0 - 12.0 fL Final    Platelets 2024 146  140 - 450 10*3/mm3 Final    Neutrophil % 2024 60.5  42.7 - 76.0 % Final    Lymphocyte % 2024 25.1  19.6 - 45.3 % Final    Monocyte % 2024 8.3  5.0 - 12.0 % Final    Eosinophil % 2024 5.0  0.3 - 6.2 % Final    Basophil % 2024 0.6  0.0 - 1.5 % Final    Immature Grans % 2024 0.5  0.0 - 0.5 % Final    Neutrophils, Absolute 2024 5.14  1.70 - 7.00 10*3/mm3 Final    Lymphocytes, Absolute 2024 2.13  0.70 - 3.10 10*3/mm3 Final    Monocytes, Absolute 2024 0.70  0.10 - 0.90 10*3/mm3 Final    Eosinophils, Absolute 2024 0.42 (H)  0.00 - 0.40 10*3/mm3 Final    Basophils, Absolute 2024 0.05  0.00 - 0.20 10*3/mm3 Final    Immature Grans, Absolute 2024 0.04  0.00 - 0.05 10*3/mm3 Final    nRBC 2024 0.0  0.0 - 0.2 /100 WBC Final    Extra Tube 2024 Hold for add-ons.   Final    Auto resulted.    Extra Tube 2024 hold for add-on   Final    Auto resulted    Extra Tube 2024 Hold for add-ons.   Final    Auto resulted.    Extra Tube 2024 Hold for add-ons.   Final    Auto resulted    QT Interval 2024 386  ms Final    QTC Interval 2024 395  ms Final   Admission on 2024, Discharged on 2024   Component Date Value Ref Range Status    Reference Lab Report 2024    Final                    Value:[facility]  [address] [place] [address]  Phone: [phone] or [phone]  Fax: [phone]  Haroldo Mandujano M.D., Medical Director    PATIENT NAME                           LABORATORY NO.  [id number]  WILLY LUJAN JR                       [id number]  [id number]                         AGE              SEX  SSN           CLIENT REF #  [place] [id number]       09/04/1932      [id number]   [id number]    [facility]                     REQUESTING M.D.     ATTENDING M.BATSHEVA.     COPY TO.  YARELIWILFRIDO HAYES [id number]                   SARAH  REGGIE  DATE COLLECTED      DATE RECEIVED      DATE REPORTED  03/11/2024 03/11/2024 03/14/2024    DIAGNOSIS:  A.   FORESKIN:  Multifocal condyloma with chronic inflammation, see comment  No high-grade dysplasia or malignancy identified  B.   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  Immunohistochemical stains with appropriate controls are performed on blocks  A1 (p16, foreskin) and B1 (p16 and Ki-67, penile biopsy) for further evaluation.  The foreskin displays patchy p16 staining without block-like reactivity in areas  of condyloma, providing no evidence of high-grade dysplasia in that specimen.  The penile biopsy shows focal block-like reactivity for p16 with corresponding  increase in Ki-67 proliferation rate, supporting focal high-grade squamous  intraepithelial lesion, approaching squamous cell carcinoma in situ.  Background  condyloma in the penile biopsy shows patchy reactivity for p16.    CLINICAL HISTORY:  Genital warts, phimosis of penis    SPECIMENS RECEIVED:  A.  FORESKIN  B.  PENIS , BIOPSY OF GLANS    MICROSCOPIC DESCRIPTION:  Tissue blocks are                           prepared and slides are examined microscopically on all  specimens. See diagnosis for details.    The internal and external (both positive and negative) controls reacted  appropriately. Some of our immunohistochemical and in situ hybridization  studies are performed as analyte specific reagents. The following  "statement  applies to those tests: This test was developed, and its performance  characteristics determined by [facility]. It has not been  cleared or approved by the [place] Food and Drug Administration. However, the  FDA has determined that approval and clearance are not necessary.    Professional interpretation rendered by Sharon Cabrera M.D., MUKUNDAMorenoP. at  [employer], [address] [place] [address].    GROSS DESCRIPTION:  A.  Received in formalin labeled \"foreskin\" is a 7.1 x 4.0 x 1.0 cm excision of  tan-pink wrinkled skin with underlying gray-pink edematous soft tissue.  Several gray-pink, verrucous, and raised lesions are noted throughout the  foreskin                           ranging in size from 0.3 x 0.3 x 0.2 (depth) cm to 1.7 x 0.8 x 0.3  (depth) cm and grossly located within 0.1-1.5 cm of the nearest surgical  margin which is inked blue.  Representative sections are submitted in 2  blocks.  [facility]  Remainder of tissue submitted in blocks A3-8 on 03/13/2024. [facility]  B.  Labeled \"biopsy of glans penis\" are 2 portions of gray-tan soft tissue  measuring 0.9 x 0.7 x 0.3 cm in aggregate and submitted entirely in 1 block  as received.  [facility]    REVIEWED, DIAGNOSED AND ELECTRONICALLY  SIGNED BY:    Sharon Cabrera M.D., ANABEL.A.P.  CPT CODES:  99715, 88342x2, 82562, 59829, 88289     Pre-Admission Testing on 03/08/2024   Component Date Value Ref Range Status    Glucose 03/08/2024 76  65 - 99 mg/dL Final    BUN 03/08/2024 16  8 - 23 mg/dL Final    Creatinine 03/08/2024 1.22  0.76 - 1.27 mg/dL Final    Sodium 03/08/2024 139  136 - 145 mmol/L Final    Potassium 03/08/2024 4.0  3.5 - 5.2 mmol/L Final    Chloride 03/08/2024 104  98 - 107 mmol/L Final    CO2 03/08/2024 25.1  22.0 - 29.0 mmol/L Final    Calcium 03/08/2024 9.0  8.2 - 9.6 mg/dL Final    BUN/Creatinine Ratio 03/08/2024 13.1  7.0 - 25.0 Final    Anion Gap 03/08/2024 9.9  5.0 - 15.0 mmol/L Final    eGFR 03/08/2024 56.0 (L)  >60.0 mL/min/1.73 " Final    WBC 03/08/2024 8.11  3.40 - 10.80 10*3/mm3 Final    RBC 03/08/2024 4.92  4.14 - 5.80 10*6/mm3 Final    Hemoglobin 03/08/2024 14.2  13.0 - 17.7 g/dL Final    Hematocrit 03/08/2024 45.5  37.5 - 51.0 % Final    MCV 03/08/2024 92.5  79.0 - 97.0 fL Final    MCH 03/08/2024 28.9  26.6 - 33.0 pg Final    MCHC 03/08/2024 31.2 (L)  31.5 - 35.7 g/dL Final    RDW 03/08/2024 15.1  12.3 - 15.4 % Final    RDW-SD 03/08/2024 51.2  37.0 - 54.0 fl Final    MPV 03/08/2024 10.5  6.0 - 12.0 fL Final    Platelets 03/08/2024 164  140 - 450 10*3/mm3 Final    QT Interval 03/08/2024 372  ms Final    QTC Interval 03/08/2024 377  ms Final        Procedure:  18 Latvian coudé cath Change, Simple    Date/Time: 5/1/2024 3:00 PM    Performed by: Kushla Dixon PA-C  Authorized by: Kushal Dixon PA-C  Consent: Verbal consent obtained.  Consent given by: patient  Patient understanding: patient states understanding of the procedure being performed  Patient consent: the patient's understanding of the procedure matches consent given  Procedure consent: procedure consent matches procedure scheduled  Relevant documents: relevant documents present and verified  Test results: test results available and properly labeled  Site marked: the operative site was marked  Imaging studies: imaging studies available  Patient identity confirmed: verbally with patient  Preparation: Patient was prepped and draped in the usual sterile fashion.  Local anesthesia used: yes    Anesthesia:  Local anesthesia used: yes    Sedation:  Patient sedated: no    Patient tolerance: patient tolerated the procedure well with no immediate complications           Assessment/Plan:   Problem List Items Addressed This Visit    None      Health Maintenance:   Health Maintenance Due   Topic Date Due    ZOSTER VACCINE (1 of 2) Never done    RSV Vaccine - Adults (1 - 1-dose 60+ series) Never done    Pneumococcal Vaccine 65+ (1 of 1 - PCV) Never done    ANNUAL WELLNESS VISIT  Never  done    COVID-19 Vaccine (3 - 2023-24 season) 09/01/2023        Smoking Counseling: Former smoker.  Counseling given.    Urine Incontinence: Patient reports that he is not currently experiencing any symptoms of urinary incontinence.    Patient was given instructions and counseling regarding his condition or for health maintenance advice. Please see specific information pulled into the AVS if appropriate.    Patient Education:   Urinary retention -did change out the patient's 18 Cook Islander coudé indwelling Leos catheter in office today and he tolerated procedure well.  No acute complications were noted.  He has had well-healed surgical incision cleared by Dr. Francis roughly 2 months ago.  Recommend to continue with observation and will see the patient back in 1 month for repeat cath change.  Visit Diagnoses:  No diagnosis found.    Meds Ordered During Visit:  No orders of the defined types were placed in this encounter.      Follow Up Appointment: 1 month  No follow-ups on file.      This document has been electronically signed by Kaylin Willams CMA   May 1, 2024 13:58 EDT    Part of this note may be an electronic transcription/translation of spoken language to printed text using the Dragon Dictation System.

## 2024-05-13 ENCOUNTER — HOSPITAL ENCOUNTER (EMERGENCY)
Facility: HOSPITAL | Age: 89
Discharge: HOME | End: 2024-05-13
Attending: EMERGENCY MEDICINE | Admitting: EMERGENCY MEDICINE
Payer: MEDICARE

## 2024-05-13 VITALS
TEMPERATURE: 97.8 F | RESPIRATION RATE: 14 BRPM | HEIGHT: 70 IN | DIASTOLIC BLOOD PRESSURE: 91 MMHG | HEART RATE: 65 BPM | OXYGEN SATURATION: 96 % | BODY MASS INDEX: 24.62 KG/M2 | WEIGHT: 172 LBS | SYSTOLIC BLOOD PRESSURE: 142 MMHG

## 2024-05-13 DIAGNOSIS — R79.1 SUPRATHERAPEUTIC INR: Primary | ICD-10-CM

## 2024-05-13 LAB
ALBUMIN SERPL-MCNC: 3.9 G/DL (ref 3.5–5.2)
ALBUMIN/GLOB SERPL: 1.3 G/DL
ALP SERPL-CCNC: 112 U/L (ref 39–117)
ALT SERPL W P-5'-P-CCNC: 13 U/L (ref 1–41)
ANION GAP SERPL CALCULATED.3IONS-SCNC: 8.7 MMOL/L (ref 5–15)
APTT PPP: 60.2 SECONDS (ref 26.5–34.5)
AST SERPL-CCNC: 19 U/L (ref 1–40)
BASOPHILS # BLD AUTO: 0.03 10*3/MM3 (ref 0–0.2)
BASOPHILS NFR BLD AUTO: 0.4 % (ref 0–1.5)
BILIRUB SERPL-MCNC: 0.3 MG/DL (ref 0–1.2)
BUN SERPL-MCNC: 17 MG/DL (ref 8–23)
BUN/CREAT SERPL: 13.3 (ref 7–25)
CALCIUM SPEC-SCNC: 9.2 MG/DL (ref 8.2–9.6)
CHLORIDE SERPL-SCNC: 107 MMOL/L (ref 98–107)
CO2 SERPL-SCNC: 27.3 MMOL/L (ref 22–29)
CREAT SERPL-MCNC: 1.28 MG/DL (ref 0.76–1.27)
DEPRECATED RDW RBC AUTO: 49.7 FL (ref 37–54)
EGFRCR SERPLBLD CKD-EPI 2021: 52.8 ML/MIN/1.73
EOSINOPHIL # BLD AUTO: 0.44 10*3/MM3 (ref 0–0.4)
EOSINOPHIL NFR BLD AUTO: 6.4 % (ref 0.3–6.2)
ERYTHROCYTE [DISTWIDTH] IN BLOOD BY AUTOMATED COUNT: 15.2 % (ref 12.3–15.4)
GLOBULIN UR ELPH-MCNC: 3 GM/DL
GLUCOSE SERPL-MCNC: 91 MG/DL (ref 65–99)
HCT VFR BLD AUTO: 46.5 % (ref 37.5–51)
HGB BLD-MCNC: 15 G/DL (ref 13–17.7)
HOLD SPECIMEN: NORMAL
HOLD SPECIMEN: NORMAL
IMM GRANULOCYTES # BLD AUTO: 0.03 10*3/MM3 (ref 0–0.05)
IMM GRANULOCYTES NFR BLD AUTO: 0.4 % (ref 0–0.5)
INR PPP: 6.5 (ref 0.9–1.1)
LYMPHOCYTES # BLD AUTO: 1.67 10*3/MM3 (ref 0.7–3.1)
LYMPHOCYTES NFR BLD AUTO: 24.2 % (ref 19.6–45.3)
MCH RBC QN AUTO: 28.9 PG (ref 26.6–33)
MCHC RBC AUTO-ENTMCNC: 32.3 G/DL (ref 31.5–35.7)
MCV RBC AUTO: 89.6 FL (ref 79–97)
MONOCYTES # BLD AUTO: 0.51 10*3/MM3 (ref 0.1–0.9)
MONOCYTES NFR BLD AUTO: 7.4 % (ref 5–12)
NEUTROPHILS NFR BLD AUTO: 4.22 10*3/MM3 (ref 1.7–7)
NEUTROPHILS NFR BLD AUTO: 61.2 % (ref 42.7–76)
NRBC BLD AUTO-RTO: 0 /100 WBC (ref 0–0.2)
PLATELET # BLD AUTO: 154 10*3/MM3 (ref 140–450)
PMV BLD AUTO: 9.9 FL (ref 6–12)
POTASSIUM SERPL-SCNC: 4.5 MMOL/L (ref 3.5–5.2)
PROT SERPL-MCNC: 6.9 G/DL (ref 6–8.5)
PROTHROMBIN TIME: 56.7 SECONDS (ref 12.1–14.7)
RBC # BLD AUTO: 5.19 10*6/MM3 (ref 4.14–5.8)
SODIUM SERPL-SCNC: 143 MMOL/L (ref 136–145)
WBC NRBC COR # BLD AUTO: 6.9 10*3/MM3 (ref 3.4–10.8)
WHOLE BLOOD HOLD COAG: NORMAL
WHOLE BLOOD HOLD SPECIMEN: NORMAL

## 2024-05-13 PROCEDURE — 80053 COMPREHEN METABOLIC PANEL: CPT | Performed by: NURSE PRACTITIONER

## 2024-05-13 PROCEDURE — 99283 EMERGENCY DEPT VISIT LOW MDM: CPT

## 2024-05-13 PROCEDURE — 25010000002 PHYTONADIONE 10 MG/ML SOLUTION: Performed by: EMERGENCY MEDICINE

## 2024-05-13 PROCEDURE — 85025 COMPLETE CBC W/AUTO DIFF WBC: CPT | Performed by: NURSE PRACTITIONER

## 2024-05-13 PROCEDURE — 36415 COLL VENOUS BLD VENIPUNCTURE: CPT

## 2024-05-13 PROCEDURE — 85610 PROTHROMBIN TIME: CPT | Performed by: EMERGENCY MEDICINE

## 2024-05-13 PROCEDURE — 85730 THROMBOPLASTIN TIME PARTIAL: CPT | Performed by: EMERGENCY MEDICINE

## 2024-05-13 RX ORDER — PHYTONADIONE 10 MG/ML
5 INJECTION, EMULSION INTRAMUSCULAR; INTRAVENOUS; SUBCUTANEOUS ONCE
Status: COMPLETED | OUTPATIENT
Start: 2024-05-13 | End: 2024-05-13

## 2024-05-13 RX ORDER — PHYTONADIONE 10 MG/ML
5 INJECTION, EMULSION INTRAMUSCULAR; INTRAVENOUS; SUBCUTANEOUS ONCE
Status: DISCONTINUED | OUTPATIENT
Start: 2024-05-13 | End: 2024-05-13

## 2024-05-13 RX ADMIN — PHYTONADIONE 5 MG: 10 INJECTION, EMULSION INTRAMUSCULAR; INTRAVENOUS; SUBCUTANEOUS at 17:37

## 2024-05-13 NOTE — ED NOTES
Pt family states PCP sent patient due to blood being too thin; he takes coumadin and he had blood work and it came back high.

## 2024-05-13 NOTE — DISCHARGE INSTRUCTIONS
Must have INR rechecked tomorrow prior to starting your medication Coumadin.  Hold dose tonight and contact Home Health or PCP for repeat test.

## 2024-05-16 NOTE — ED PROVIDER NOTES
Subjective   History of Present Illness  Patient sent to the ED by Home Health Nurse for elevated of INR.  Patient denied any bleeding or negative symptoms.    History provided by:  Patient   used: No        Review of Systems   Constitutional:  Negative for activity change, appetite change, chills, diaphoresis, fatigue and fever.   HENT:  Negative for congestion, ear pain and sore throat.    Eyes:  Negative for redness.   Respiratory:  Negative for cough, chest tightness, shortness of breath and wheezing.    Cardiovascular:  Negative for chest pain, palpitations and leg swelling.   Gastrointestinal:  Negative for abdominal pain, diarrhea, nausea and vomiting.   Genitourinary:  Negative for dysuria and urgency.   Musculoskeletal:  Negative for arthralgias, back pain, myalgias and neck pain.   Skin:  Negative for pallor, rash and wound.   Neurological:  Negative for dizziness, speech difficulty, weakness and headaches.   Psychiatric/Behavioral:  Negative for agitation, behavioral problems, confusion and decreased concentration.    All other systems reviewed and are negative.      Past Medical History:   Diagnosis Date    Atrial fibrillation     Dementia     Enlarged prostate     Leos catheter in place     GERD (gastroesophageal reflux disease)     [place] (hard of hearing)     Renal disorder     Sleep apnea        Allergies   Allergen Reactions    Penicillins Hives                     Past Surgical History:   Procedure Laterality Date    CIRCUMCISION N/A 3/11/2024    Procedure: CIRCUMCISION, FULGERATION OF WARTS, AND BIOPSY;  Surgeon: Jorge Alberto Francis MD;  Location: [facility] OR;  Service: Urology;  Laterality: N/A;    COLONOSCOPY         Family History   Problem Relation Age of Onset    No Known Problems Father     No Known Problems Mother        Social History     Socioeconomic History    Marital status:    Tobacco Use    Smoking status: Former    Smokeless tobacco: Never   Vaping Use     Vaping status: Never Used   Substance and Sexual Activity    Alcohol use: Never    Drug use: Never    Sexual activity: Defer           Objective   Physical Exam  Vitals and nursing note reviewed.   Constitutional:       General: He is not in acute distress.     Appearance: Normal appearance. He is well-developed. He is not toxic-appearing or diaphoretic.   HENT:      Head: Normocephalic and atraumatic.      Right Ear: External ear normal.      Left Ear: External ear normal.      Nose: Nose normal.      Mouth/Throat:      Pharynx: No oropharyngeal exudate.      Tonsils: No tonsillar exudate.   Eyes:      General: Lids are normal.      Conjunctiva/sclera: Conjunctivae normal.      Pupils: Pupils are equal, round, and reactive to light.   Neck:      Thyroid: No thyromegaly.   Cardiovascular:      Rate and Rhythm: Normal rate and regular rhythm.      Pulses: Normal pulses.      Heart sounds: Normal heart sounds, S1 normal and S2 normal.   Pulmonary:      Effort: Pulmonary effort is normal. No tachypnea or respiratory distress.      Breath sounds: Normal breath sounds. No decreased breath sounds, wheezing or rales.   Chest:      Chest wall: No tenderness.   Abdominal:      General: Bowel sounds are normal. There is no distension.      Palpations: Abdomen is soft.      Tenderness: There is no abdominal tenderness. There is no guarding or rebound.   Musculoskeletal:         General: No tenderness or deformity. Normal range of motion.      Cervical back: Full passive range of motion without pain, normal range of motion and neck supple.   Lymphadenopathy:      Cervical: No cervical adenopathy.   Skin:     General: Skin is warm and dry.      Coloration: Skin is not pale.      Findings: No erythema or rash.   Neurological:      Mental Status: He is alert and oriented to person, place, and time.      GCS: GCS eye subscore is 4. GCS verbal subscore is 5. GCS motor subscore is 6.      Cranial Nerves: No cranial nerve deficit.       Sensory: No sensory deficit.   Psychiatric:         Speech: Speech normal.         Behavior: Behavior normal.         Thought Content: Thought content normal.         Judgment: Judgment normal.         Procedures           ED Course  ED Course as of 05/16/24 1134   Thu May 16, 2024   1134 Patient was provided treatment and will hold his Coumadin Dose this evening.  Patient is to have his INR rechecked tomorrow for reevaluation. [ES]      ED Course User Index  [ES] Tye Baron MD                                             Medical Decision Making  Problems Addressed:  Supratherapeutic INR: complicated acute illness or injury    Amount and/or Complexity of Data Reviewed  Labs: ordered.    Risk  Prescription drug management.        Final diagnoses:   Supratherapeutic INR       ED Disposition  ED Disposition       ED Disposition   Discharge    Condition   Stable    Comment   --               Babar Marcus MD  [address] [facility]  [address] [facility] [address] [phone]    Schedule an appointment as soon as possible for a visit in 1 day  Must get INR reevaluated         Medication List      No changes were made to your prescriptions during this visit.            Tye Baron MD  05/16/24 [id number]

## 2024-06-03 ENCOUNTER — OFFICE VISIT (OUTPATIENT)
Dept: UROLOGY | Facility: CLINIC | Age: 89
End: 2024-06-03
Payer: MEDICARE

## 2024-06-03 VITALS
SYSTOLIC BLOOD PRESSURE: 138 MMHG | HEIGHT: 70 IN | DIASTOLIC BLOOD PRESSURE: 78 MMHG | BODY MASS INDEX: 25.34 KG/M2 | HEART RATE: 70 BPM | WEIGHT: 177 LBS

## 2024-06-03 DIAGNOSIS — Z46.6 URINARY CATHETER (FOLEY) CHANGE REQUIRED: Primary | ICD-10-CM

## 2024-06-03 PROCEDURE — 1159F MED LIST DOCD IN RCRD: CPT

## 2024-06-03 PROCEDURE — 1160F RVW MEDS BY RX/DR IN RCRD: CPT

## 2024-06-03 PROCEDURE — 51702 INSERT TEMP BLADDER CATH: CPT

## 2024-06-03 NOTE — PROGRESS NOTES
"Chief Complaint:    Chief Complaint   Patient presents with    Urinary catheter (Leos) change required       Vital Signs:   /78   Pulse 70   Ht 177.8 cm (70\")   Wt 80.3 kg (177 lb)   BMI 25.40 kg/m²   Body mass index is 25.4 kg/m².      HPI:  Stephenie Valdez Jr. is a 89 y.o. male who presents today for follow up    History of Present Illness  Mr. Valdez presents to the clinic for a follow-up for urinary retention requiring monthly cath exchanges.  Patient was last seen in office 1 month ago and underwent cath change at that time with no significant complications.  He denies any difficulty with cath exchange stents.  I did change out the patient's 18 French coudé indwelling Leos catheter received yellow urine in return.  He tolerated procedure well and will follow-up in 1 month for repeat cath change.      Past Medical History:  Past Medical History:   Diagnosis Date    Atrial fibrillation     Dementia     Enlarged prostate     Leos catheter in place     GERD (gastroesophageal reflux disease)     [place] (hard of hearing)     Renal disorder     Sleep apnea        Current Meds:  Current Outpatient Medications   Medication Sig Dispense Refill    clotrimazole-betamethasone (Lotrisone) 1-0.05 % cream Apply 1 application topically to the appropriate area as directed 2 (Two) Times a Day. 45 g 2    HYDROcodone-acetaminophen (NORCO) 5-325 MG per tablet Take 1-2 tablets by mouth Every 6 (Six) Hours As Needed (Pain). 12 tablet 0    imiquimod (ALDARA) 5 % cream Apply to warts 3 times weekly at bedtime.  Leave on for 6 to 10 hours. 12 each 1    warfarin (COUMADIN) 4 MG tablet Take 1 tablet by mouth Daily.       No current facility-administered medications for this visit.        Allergies:   Allergies   Allergen Reactions    Penicillins Hives                      Past Surgical History:  Past Surgical History:   Procedure Laterality Date    CIRCUMCISION N/A 3/11/2024    Procedure: CIRCUMCISION, FULGERATION OF WARTS, AND " BIOPSY;  Surgeon: Jorge Alberto Francis MD;  Location: [facility];  Service: Urology;  Laterality: N/A;    COLONOSCOPY         Social History:  Social History     Socioeconomic History    Marital status:    Tobacco Use    Smoking status: Former    Smokeless tobacco: Never   Vaping Use    Vaping status: Never Used   Substance and Sexual Activity    Alcohol use: Never    Drug use: Never    Sexual activity: Defer       Family History:  Family History   Problem Relation Age of Onset    No Known Problems Father     No Known Problems Mother        Review of Systems:  Review of Systems   Constitutional:  Negative for fatigue, fever and unexpected weight change.   Respiratory:  Negative for chest tightness and shortness of breath.    Cardiovascular:  Negative for chest pain.   Gastrointestinal:  Negative for abdominal pain, constipation, diarrhea, nausea and vomiting.   Genitourinary:  Positive for difficulty urinating. Negative for dysuria, frequency, penile discharge, penile pain, penile swelling and urgency.   Skin:  Negative for rash.   Psychiatric/Behavioral:  Negative for confusion and suicidal ideas.        Physical Exam:  Physical Exam  Constitutional:       General: He is not in acute distress.     Appearance: Normal appearance.   HENT:      Head: Normocephalic and atraumatic.      Nose: Nose normal.      Mouth/Throat:      Mouth: Mucous membranes are moist.   Eyes:      Conjunctiva/sclera: Conjunctivae normal.   Cardiovascular:      Rate and Rhythm: Normal rate and regular rhythm.      Pulses: Normal pulses.      Heart sounds: Normal heart sounds.   Pulmonary:      Effort: Pulmonary effort is normal.      Breath sounds: Normal breath sounds.   Abdominal:      General: Bowel sounds are normal.      Palpations: Abdomen is soft.   Genitourinary:     Penis: Normal.       Testes: Normal.      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  Minimal tissue overgrowth at the base of previous foreskin.  Musculoskeletal:         General: Normal range of motion.      Cervical back: Normal range of motion.   Skin:     General: Skin is warm.   Neurological:      General: No focal deficit present.      Mental Status: He is alert and oriented to person, place, and time.   Psychiatric:         Mood and Affect: Mood normal.         Behavior: Behavior normal.         Thought Content: Thought content normal.         Judgment: Judgment normal.             Recent Image (CT and/or KUB):   CT Abdomen and Pelvis: No results found for this or any previous visit.     CT Stone Protocol: Results for orders placed during the hospital encounter of 07/26/22    CT Abdomen Pelvis Stone Protocol    Narrative  CT Abdomen Pelvis WO    INDICATION:  Hematuria    TECHNIQUE:  CT of the abdomen and pelvis without IV contrast. Coronal and sagittal reconstructions were obtained.  Radiation dose reduction techniques included automated exposure control or exposure modulation based on body size. Count of known CT and cardiac nuc  med studies performed in previous 12 months: 1.    COMPARISON:  6/30/2022    FINDINGS:  Abdomen: Multiple hepatic cysts are again seen, unchanged. Nonspecific low-density splenic lesions are again noted, also unchanged. The pancreas is unremarkable. There is a 2 to 3 cm duodenal diverticulum again noted. Bilateral hydronephrosis is seen and  this has increased since the previous examination. Both ureters are dilated down to the bladder but no ureteral stones are seen. The bladder is distended. There is a Leos catheter. The balloon is within the penile urethra. No evidence of retroperitoneal  adenopathy. No distended bowel loops.    Pelvis: Normal appendix. No adenopathy or pelvic mass.    Impression  The Leos catheter has slipped distally such that the balloon is in the penile urethra. The bladder is distended consistent with bladder outlet  obstruction and there is bilateral hydronephrosis.          Signer Name: Ricky Arredondo MD  Signed: 7/26/2022 7:13 PM  Workstation Name: [id number]  [facility]     KU: No results found for this or any previous visit.       Labs:  Brief Urine Lab Results  (Last result in the past 365 days)        Color   Clarity   Blood   Leuk Est   Nitrite   Protein   CREAT   Urine HCG        10/16/23 1957 Yellow   Clear   Small (1+)   Large (3+)   Positive   Negative                 Admission on 05/13/2024, Discharged on 05/13/2024   Component Date Value Ref Range Status    Glucose 05/13/2024 91  65 - 99 mg/dL Final    BUN 05/13/2024 17  8 - 23 mg/dL Final    Creatinine 05/13/2024 1.28 (H)  0.76 - 1.27 mg/dL Final    Sodium 05/13/2024 143  136 - 145 mmol/L Final    Potassium 05/13/2024 4.5  3.5 - 5.2 mmol/L Final    Slight hemolysis detected by analyzer. Result may be falsely elevated.    Chloride 05/13/2024 107  98 - 107 mmol/L Final    CO2 05/13/2024 27.3  22.0 - 29.0 mmol/L Final    Calcium 05/13/2024 9.2  8.2 - 9.6 mg/dL Final    Total Protein 05/13/2024 6.9  6.0 - 8.5 g/dL Final    Albumin 05/13/2024 3.9  3.5 - 5.2 g/dL Final    ALT (SGPT) 05/13/2024 13  1 - 41 U/L Final    AST (SGOT) 05/13/2024 19  1 - 40 U/L Final    Alkaline Phosphatase 05/13/2024 112  39 - 117 U/L Final    Total Bilirubin 05/13/2024 0.3  0.0 - 1.2 mg/dL Final    Globulin 05/13/2024 3.0  gm/dL Final    A/G Ratio 05/13/2024 1.3  g/dL Final    BUN/Creatinine Ratio 05/13/2024 13.3  7.0 - 25.0 Final    Anion Gap 05/13/2024 8.7  5.0 - 15.0 mmol/L Final    eGFR 05/13/2024 52.8 (L)  >60.0 mL/min/1.73 Final    WBC 05/13/2024 6.90  3.40 - 10.80 10*3/mm3 Final    RBC 05/13/2024 5.19  4.14 - 5.80 10*6/mm3 Final    Hemoglobin 05/13/2024 15.0  13.0 - 17.7 g/dL Final    Hematocrit 05/13/2024 46.5  37.5 - 51.0 % Final    MCV 05/13/2024 89.6  79.0 - 97.0 fL Final    MCH 05/13/2024 28.9  26.6 - 33.0 pg Final    MCHC 05/13/2024 32.3  31.5 - 35.7  g/dL Final    RDW 05/13/2024 15.2  12.3 - 15.4 % Final    RDW-SD 05/13/2024 49.7  37.0 - 54.0 fl Final    MPV 05/13/2024 9.9  6.0 - 12.0 fL Final    Platelets 05/13/2024 154  140 - 450 10*3/mm3 Final    Neutrophil % 05/13/2024 61.2  42.7 - 76.0 % Final    Lymphocyte % 05/13/2024 24.2  19.6 - 45.3 % Final    Monocyte % 05/13/2024 7.4  5.0 - 12.0 % Final    Eosinophil % 05/13/2024 6.4 (H)  0.3 - 6.2 % Final    Basophil % 05/13/2024 0.4  0.0 - 1.5 % Final    Immature Grans % 05/13/2024 0.4  0.0 - 0.5 % Final    Neutrophils, Absolute 05/13/2024 4.22  1.70 - 7.00 10*3/mm3 Final    Lymphocytes, Absolute 05/13/2024 1.67  0.70 - 3.10 10*3/mm3 Final    Monocytes, Absolute 05/13/2024 0.51  0.10 - 0.90 10*3/mm3 Final    Eosinophils, Absolute 05/13/2024 0.44 (H)  0.00 - 0.40 10*3/mm3 Final    Basophils, Absolute 05/13/2024 0.03  0.00 - 0.20 10*3/mm3 Final    Immature Grans, Absolute 05/13/2024 0.03  0.00 - 0.05 10*3/mm3 Final    nRBC 05/13/2024 0.0  0.0 - 0.2 /100 WBC Final    Extra Tube 05/13/2024 Hold for add-ons.   Final    Auto resulted.    Extra Tube 05/13/2024 hold for add-on   Final    Auto resulted    Extra Tube 05/13/2024 Hold for add-ons.   Final    Auto resulted.    Extra Tube 05/13/2024 Hold for add-ons.   Final    Auto resulted    Protime 05/13/2024 56.7 (C)  12.1 - 14.7 Seconds Final    INR 05/13/2024 6.50 (C)  0.90 - 1.10 Final    PTT 05/13/2024 60.2 (H)  26.5 - 34.5 seconds Final   Admission on 04/25/2024, Discharged on 04/25/2024   Component Date Value Ref Range Status    Glucose 04/25/2024 98  65 - 99 mg/dL Final    BUN 04/25/2024 18  8 - 23 mg/dL Final    Creatinine 04/25/2024 1.20  0.76 - 1.27 mg/dL Final    Sodium 04/25/2024 138  136 - 145 mmol/L Final    Potassium 04/25/2024 4.5  3.5 - 5.2 mmol/L Final    Chloride 04/25/2024 103  98 - 107 mmol/L Final    CO2 04/25/2024 26.4  22.0 - 29.0 mmol/L Final    Calcium 04/25/2024 9.1  8.2 - 9.6 mg/dL Final    Total Protein 04/25/2024 6.9  6.0 - 8.5 g/dL Final     Albumin 04/25/2024 3.7  3.5 - 5.2 g/dL Final    ALT (SGPT) 04/25/2024 12  1 - 41 U/L Final    AST (SGOT) 04/25/2024 18  1 - 40 U/L Final    Alkaline Phosphatase 04/25/2024 112  39 - 117 U/L Final    Total Bilirubin 04/25/2024 0.3  0.0 - 1.2 mg/dL Final    Globulin 04/25/2024 3.2  gm/dL Final    A/G Ratio 04/25/2024 1.2  g/dL Final    BUN/Creatinine Ratio 04/25/2024 15.0  7.0 - 25.0 Final    Anion Gap 04/25/2024 8.6  5.0 - 15.0 mmol/L Final    eGFR 04/25/2024 57.1 (L)  >60.0 mL/min/1.73 Final    PTT 04/25/2024 41.6 (H)  26.5 - 34.5 seconds Final    Protime 04/25/2024 31.4 (H)  12.1 - 14.7 Seconds Final    INR 04/25/2024 2.94 (H)  0.90 - 1.10 Final    WBC 04/25/2024 8.48  3.40 - 10.80 10*3/mm3 Final    RBC 04/25/2024 5.05  4.14 - 5.80 10*6/mm3 Final    Hemoglobin 04/25/2024 14.7  13.0 - 17.7 g/dL Final    Hematocrit 04/25/2024 46.5  37.5 - 51.0 % Final    MCV 04/25/2024 92.1  79.0 - 97.0 fL Final    MCH 04/25/2024 29.1  26.6 - 33.0 pg Final    MCHC 04/25/2024 31.6  31.5 - 35.7 g/dL Final    RDW 04/25/2024 15.1  12.3 - 15.4 % Final    RDW-SD 04/25/2024 50.4  37.0 - 54.0 fl Final    MPV 04/25/2024 10.2  6.0 - 12.0 fL Final    Platelets 04/25/2024 146  140 - 450 10*3/mm3 Final    Neutrophil % 04/25/2024 60.5  42.7 - 76.0 % Final    Lymphocyte % 04/25/2024 25.1  19.6 - 45.3 % Final    Monocyte % 04/25/2024 8.3  5.0 - 12.0 % Final    Eosinophil % 04/25/2024 5.0  0.3 - 6.2 % Final    Basophil % 04/25/2024 0.6  0.0 - 1.5 % Final    Immature Grans % 04/25/2024 0.5  0.0 - 0.5 % Final    Neutrophils, Absolute 04/25/2024 5.14  1.70 - 7.00 10*3/mm3 Final    Lymphocytes, Absolute 04/25/2024 2.13  0.70 - 3.10 10*3/mm3 Final    Monocytes, Absolute 04/25/2024 0.70  0.10 - 0.90 10*3/mm3 Final    Eosinophils, Absolute 04/25/2024 0.42 (H)  0.00 - 0.40 10*3/mm3 Final    Basophils, Absolute 04/25/2024 0.05  0.00 - 0.20 10*3/mm3 Final    Immature Grans, Absolute 04/25/2024 0.04  0.00 - 0.05 10*3/mm3 Final    nRBC 04/25/2024 0.0  0.0 - 0.2  /100 WBC Final    Extra Tube 2024 Hold for add-ons.   Final    Auto resulted.    Extra Tube 2024 hold for add-on   Final    Auto resulted    Extra Tube 2024 Hold for add-ons.   Final    Auto resulted.    Extra Tube 2024 Hold for add-ons.   Final    Auto resulted    QT Interval 2024 386  ms Final    QTC Interval 2024 395  ms Final   Admission on 2024, Discharged on 2024   Component Date Value Ref Range Status    Reference Lab Report 2024    Final                    Value:[facility]  [address] [place] [address]  Phone: [phone] or [phone]  Fax: [phone]  Haroldo Mandujano M.D., Medical Director    PATIENT NAME                           LABORATORY NO.  [id number]  WILLY LUJAN JR                       [id number]  [id number]                         AGE              SEX  SSN           CLIENT REF #  [facility] [place]              89      1932      [id number]   [id number]    [address]                     REQUESTING M.D.     ATTENDING [place] [address]                   REGGIE SMITH  DATE COLLECTED      DATE RECEIVED      DATE REPORTED  2024    DIAGNOSIS:  A.   FORESKIN:  Multifocal condyloma with chronic inflammation, see comment  No high-grade dysplasia or malignancy identified  B.   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  Immunohistochemical stains with appropriate controls are performed on blocks  A1 (p16, foreskin) and B1 (p16 and Ki-67, penile biopsy) for further evaluation.  The foreskin displays  "patchy p16 staining without block-like reactivity in areas  of condyloma, providing no evidence of high-grade dysplasia in that specimen.  The penile biopsy shows focal block-like reactivity for p16 with corresponding  increase in Ki-67 proliferation rate, supporting focal high-grade squamous  intraepithelial lesion, approaching squamous cell carcinoma in situ.  Background  condyloma in the penile biopsy shows patchy reactivity for p16.    CLINICAL HISTORY:  Genital warts, phimosis of penis    SPECIMENS RECEIVED:  A.  FORESKIN  B.  PENIS , BIOPSY OF GLANS    MICROSCOPIC DESCRIPTION:  Tissue blocks are                           prepared and slides are examined microscopically on all  specimens. See diagnosis for details.    The internal and external (both positive and negative) controls reacted  appropriately. Some of our immunohistochemical and in situ hybridization  studies are performed as analyte specific reagents. The following statement  applies to those tests: This test was developed, and its performance  characteristics determined by [facility]. It has not been  cleared or approved by the US Food and Drug Administration. However, the  FDA has determined that approval and clearance are not necessary.    Professional interpretation rendered by Sharon Cabrera M.D., F.C.A.P. at  [facility] [employer], [address] [place] [address].    GROSS DESCRIPTION:  A.  Received in formalin labeled \"foreskin\" is a 7.1 x 4.0 x 1.0 cm excision of  tan-pink wrinkled skin with underlying gray-pink edematous soft tissue.  Several gray-pink, verrucous, and raised lesions are noted throughout the  foreskin                           ranging in size from 0.3 x 0.3 x 0.2 (depth) cm to 1.7 x 0.8 x 0.3  (depth) cm and grossly located within 0.1-1.5 cm of the nearest surgical  margin which is inked blue.  Representative sections are submitted in 2  blocks.  MTH  Remainder of tissue submitted in blocks A3-8 on 03/13/2024. " "SOURAV  B.  Labeled \"biopsy of glans penis\" are 2 portions of gray-tan soft tissue  measuring 0.9 x 0.7 x 0.3 cm in aggregate and submitted entirely in 1 block  as received.  [facility]    REVIEWED, DIAGNOSED AND ELECTRONICALLY  SIGNED BY:    Sharon Cabrera M.D., [email or web address].[email or web address].  CPT CODES:  11940, 88342x2, 99718, 04287, 57580     Pre-Admission Testing on 03/08/2024   Component Date Value Ref Range Status    Glucose 03/08/2024 76  65 - 99 mg/dL Final    BUN 03/08/2024 16  8 - 23 mg/dL Final    Creatinine 03/08/2024 1.22  0.76 - 1.27 mg/dL Final    Sodium 03/08/2024 139  136 - 145 mmol/L Final    Potassium 03/08/2024 4.0  3.5 - 5.2 mmol/L Final    Chloride 03/08/2024 104  98 - 107 mmol/L Final    CO2 03/08/2024 25.1  22.0 - 29.0 mmol/L Final    Calcium 03/08/2024 9.0  8.2 - 9.6 mg/dL Final    BUN/Creatinine Ratio 03/08/2024 13.1  7.0 - 25.0 Final    Anion Gap 03/08/2024 9.9  5.0 - 15.0 mmol/L Final    eGFR 03/08/2024 56.0 (L)  >60.0 mL/min/1.73 Final    WBC 03/08/2024 8.11  3.40 - 10.80 10*3/mm3 Final    RBC 03/08/2024 4.92  4.14 - 5.80 10*6/mm3 Final    Hemoglobin 03/08/2024 14.2  13.0 - 17.7 g/dL Final    Hematocrit 03/08/2024 45.5  37.5 - 51.0 % Final    MCV 03/08/2024 92.5  79.0 - 97.0 fL Final    MCH 03/08/2024 28.9  26.6 - 33.0 pg Final    MCHC 03/08/2024 31.2 (L)  31.5 - 35.7 g/dL Final    RDW 03/08/2024 15.1  12.3 - 15.4 % Final    RDW-SD 03/08/2024 51.2  37.0 - 54.0 fl Final    MPV 03/08/2024 10.5  6.0 - 12.0 fL Final    Platelets 03/08/2024 164  140 - 450 10*3/mm3 Final    QT Interval 03/08/2024 372  ms Final    QTC Interval 03/08/2024 377  ms Final        Procedure:  Cath Change, Simple    Date/Time: 6/3/2024 2:59 PM    Performed by: Kushal Dixon PA-C  Authorized by: Kushal Dixon PA-C  Consent: Verbal consent obtained.  Risks and benefits: risks, benefits and alternatives were discussed  Consent given by: patient  Patient understanding: patient states understanding of the procedure being performed  Patient " consent: the patient's understanding of the procedure matches consent given  Procedure consent: procedure consent matches procedure scheduled  Relevant documents: relevant documents present and verified  Test results: test results available and properly labeled  Site marked: the operative site was marked  Imaging studies: imaging studies available  Patient identity confirmed: verbally with patient  Local anesthesia used: yes    Anesthesia:  Local anesthesia used: yes    Sedation:  Patient sedated: no    Patient tolerance: patient tolerated the procedure well with no immediate complications           Assessment/Plan:   Problem List Items Addressed This Visit       Urinary catheter (Leos) change required - Primary       Health Maintenance:   Health Maintenance Due   Topic Date Due    ZOSTER VACCINE (1 of 2) Never done    RSV Vaccine - Adults (1 - 1-dose 60+ series) Never done    Pneumococcal Vaccine 65+ (1 of 1 - PCV) Never done    ANNUAL WELLNESS VISIT  Never done    BMI FOLLOWUP  06/30/2023    COVID-19 Vaccine (3 - 2023-24 season) 09/01/2023        Smoking Counseling: Former smoker.  Never used smokeless tobacco.  Counseling given.    Urine Incontinence: Patient reports that he is not currently experiencing any symptoms of urinary incontinence.    Patient was given instructions and counseling regarding his condition or for health maintenance advice. Please see specific information pulled into the AVS if appropriate.    Patient Education:   Urinary cath exchange -patient's 18 Bulgarian indwelling Leos catheter was exchanged in office today recommend to keep monthly follow-ups for repeat cath exchanges.  Advised him return to the clinic if he has any worsening penile pain or significant discoloration.  Vies him return sooner if he has any difficulty with catheter drainage.  He verbalized understanding.    Visit Diagnoses:    ICD-10-CM ICD-9-CM   1. Urinary catheter (Leos) change required  Z46.6 V53.6       Meds  Ordered During Visit:  No orders of the defined types were placed in this encounter.      Follow Up Appointment: 1 month  No follow-ups on file.      This document has been electronically signed by Kushal Dixon PA-C   Maria Ines 3, 2024 14:59 EDT    Part of this note may be an electronic transcription/translation of spoken language to printed text using the Dragon Dictation System.

## 2024-07-10 ENCOUNTER — OFFICE VISIT (OUTPATIENT)
Dept: UROLOGY | Facility: CLINIC | Age: 89
End: 2024-07-10
Payer: MEDICARE

## 2024-07-10 VITALS
WEIGHT: 176 LBS | HEIGHT: 70 IN | BODY MASS INDEX: 25.2 KG/M2 | SYSTOLIC BLOOD PRESSURE: 164 MMHG | DIASTOLIC BLOOD PRESSURE: 87 MMHG | HEART RATE: 65 BPM

## 2024-07-10 DIAGNOSIS — Z46.6 URINARY CATHETER (FOLEY) CHANGE REQUIRED: Primary | ICD-10-CM

## 2024-07-10 NOTE — PROGRESS NOTES
"Chief Complaint:    Chief Complaint   Patient presents with    Urinary Catheter Change      Cath Change        Vital Signs:   /87   Pulse 65   Ht 177.8 cm (70\")   Wt 79.8 kg (176 lb)   BMI 25.25 kg/m²   Body mass index is 25.25 kg/m².      HPI:  Stephenie Valdez Jr. is a 89 y.o. male who presents today for follow up    History of Present Illness  Mr. Valdez presents to the clinic for follow-up for urinary retention.  He undergoes monthly cath changes in office and also has history of genital warts and penile cancer.  Recent underwent elective circumcision by Dr. Francis and is healed well.  He does not wish to have any further treatment for his penile cancer.  I did change out the patient's 18 French coudé catheter in office today and received yellow urine in return.  No complications were noted.  Will see him back in a month for repeat cath change.      Past Medical History:  Past Medical History:   Diagnosis Date    Atrial fibrillation     Dementia     Enlarged prostate     Leos catheter in place     GERD (gastroesophageal reflux disease)     [place] (hard of hearing)     Renal disorder     Sleep apnea        Current Meds:  Current Outpatient Medications   Medication Sig Dispense Refill    clotrimazole-betamethasone (Lotrisone) 1-0.05 % cream Apply 1 application topically to the appropriate area as directed 2 (Two) Times a Day. 45 g 2    HYDROcodone-acetaminophen (NORCO) 5-325 MG per tablet Take 1-2 tablets by mouth Every 6 (Six) Hours As Needed (Pain). 12 tablet 0    imiquimod (ALDARA) 5 % cream Apply to warts 3 times weekly at bedtime.  Leave on for 6 to 10 hours. 12 each 1    warfarin (COUMADIN) 4 MG tablet Take 1 tablet by mouth Daily.       No current facility-administered medications for this visit.        Allergies:   Allergies   Allergen Reactions    Penicillins Hives                      Past Surgical History:  Past Surgical History:   Procedure Laterality Date    CIRCUMCISION N/A 3/11/2024    " Procedure: CIRCUMCISION, FULGERATION OF WARTS, AND BIOPSY;  Surgeon: Jorge Alberto Francis MD;  Location: [facility] OR;  Service: Urology;  Laterality: N/A;    COLONOSCOPY         Social History:  Social History     Socioeconomic History    Marital status:    Tobacco Use    Smoking status: Former    Smokeless tobacco: Never   Vaping Use    Vaping status: Never Used   Substance and Sexual Activity    Alcohol use: Never    Drug use: Never    Sexual activity: Defer       Family History:  Family History   Problem Relation Age of Onset    No Known Problems Father     No Known Problems Mother        Review of Systems:  Review of Systems   Constitutional:  Positive for fatigue. Negative for chills, fever and unexpected weight change.   HENT:  Negative for congestion and sinus pressure.    Respiratory:  Negative for chest tightness and shortness of breath.    Cardiovascular:  Negative for chest pain.   Gastrointestinal:  Negative for abdominal pain, constipation, diarrhea, nausea and vomiting.   Genitourinary:  Positive for difficulty urinating. Negative for dysuria, flank pain, frequency, hematuria and urgency.   Musculoskeletal:  Negative for back pain and neck pain.   Skin:  Negative for rash.   Neurological:  Negative for dizziness and headaches.   Hematological:  Bruises/bleeds easily.   Psychiatric/Behavioral:  Negative for confusion and suicidal ideas. The patient is not nervous/anxious.        Physical Exam:  Physical Exam  Constitutional:       General: He is not in acute distress.     Appearance: Normal appearance.   HENT:      Head: Normocephalic and atraumatic.      Nose: Nose normal.      Mouth/Throat:      Mouth: Mucous membranes are moist.   Eyes:      Conjunctiva/sclera: Conjunctivae normal.   Cardiovascular:      Rate and Rhythm: Normal rate and regular rhythm.      Pulses: Normal pulses.      Heart sounds: Normal heart sounds.   Pulmonary:      Effort: Pulmonary effort is normal.      Breath sounds:  Normal breath sounds.   Abdominal:      General: Bowel sounds are normal.      Palpations: Abdomen is soft.   Musculoskeletal:         General: Normal range of motion.      Cervical back: Normal range of motion.   Skin:     General: Skin is warm.   Neurological:      General: No focal deficit present.      Mental Status: He is alert and oriented to person, place, and time.   Psychiatric:         Mood and Affect: Mood normal.         Behavior: Behavior normal.         Thought Content: Thought content normal.         Judgment: Judgment normal.         Recent Image (CT and/or KUB):   CT Abdomen and Pelvis: No results found for this or any previous visit.     CT Stone Protocol: Results for orders placed during the hospital encounter of 07/26/22    CT Abdomen Pelvis Stone Protocol    Narrative  CT Abdomen Pelvis WO    INDICATION:  Hematuria    TECHNIQUE:  CT of the abdomen and pelvis without IV contrast. Coronal and sagittal reconstructions were obtained.  Radiation dose reduction techniques included automated exposure control or exposure modulation based on body size. Count of known CT and cardiac nuc  med studies performed in previous 12 months: 1.    COMPARISON:  6/30/2022    FINDINGS:  Abdomen: Multiple hepatic cysts are again seen, unchanged. Nonspecific low-density splenic lesions are again noted, also unchanged. The pancreas is unremarkable. There is a 2 to 3 cm duodenal diverticulum again noted. Bilateral hydronephrosis is seen and  this has increased since the previous examination. Both ureters are dilated down to the bladder but no ureteral stones are seen. The bladder is distended. There is a Leos catheter. The balloon is within the penile urethra. No evidence of retroperitoneal  adenopathy. No distended bowel loops.    Pelvis: Normal appendix. No adenopathy or pelvic mass.    Impression  The Leos catheter has slipped distally such that the balloon is in the penile urethra. The bladder is distended  consistent with bladder outlet obstruction and there is bilateral hydronephrosis.          Signer Name: Ricky Arredondo MD  Signed: 7/26/2022 7:13 PM  Workstation Name: [id number]  [facility]     KUB: No results found for this or any previous visit.       Labs:  Brief Urine Lab Results  (Last result in the past 365 days)        Color   Clarity   Blood   Leuk Est   Nitrite   Protein   CREAT   Urine HCG        10/16/23 1957 Yellow   Clear   Small (1+)   Large (3+)   Positive   Negative                 Admission on 05/13/2024, Discharged on 05/13/2024   Component Date Value Ref Range Status    Glucose 05/13/2024 91  65 - 99 mg/dL Final    BUN 05/13/2024 17  8 - 23 mg/dL Final    Creatinine 05/13/2024 1.28 (H)  0.76 - 1.27 mg/dL Final    Sodium 05/13/2024 143  136 - 145 mmol/L Final    Potassium 05/13/2024 4.5  3.5 - 5.2 mmol/L Final    Slight hemolysis detected by analyzer. Result may be falsely elevated.    Chloride 05/13/2024 107  98 - 107 mmol/L Final    CO2 05/13/2024 27.3  22.0 - 29.0 mmol/L Final    Calcium 05/13/2024 9.2  8.2 - 9.6 mg/dL Final    Total Protein 05/13/2024 6.9  6.0 - 8.5 g/dL Final    Albumin 05/13/2024 3.9  3.5 - 5.2 g/dL Final    ALT (SGPT) 05/13/2024 13  1 - 41 U/L Final    AST (SGOT) 05/13/2024 19  1 - 40 U/L Final    Alkaline Phosphatase 05/13/2024 112  39 - 117 U/L Final    Total Bilirubin 05/13/2024 0.3  0.0 - 1.2 mg/dL Final    Globulin 05/13/2024 3.0  gm/dL Final    A/G Ratio 05/13/2024 1.3  g/dL Final    BUN/Creatinine Ratio 05/13/2024 13.3  7.0 - 25.0 Final    Anion Gap 05/13/2024 8.7  5.0 - 15.0 mmol/L Final    eGFR 05/13/2024 52.8 (L)  >60.0 mL/min/1.73 Final    WBC 05/13/2024 6.90  3.40 - 10.80 10*3/mm3 Final    RBC 05/13/2024 5.19  4.14 - 5.80 10*6/mm3 Final    Hemoglobin 05/13/2024 15.0  13.0 - 17.7 g/dL Final    Hematocrit 05/13/2024 46.5  37.5 - 51.0 % Final    MCV 05/13/2024 89.6  79.0 - 97.0 fL Final    MCH 05/13/2024 28.9  26.6 - 33.0 pg Final    MCHC  05/13/2024 32.3  31.5 - 35.7 g/dL Final    RDW 05/13/2024 15.2  12.3 - 15.4 % Final    RDW-SD 05/13/2024 49.7  37.0 - 54.0 fl Final    MPV 05/13/2024 9.9  6.0 - 12.0 fL Final    Platelets 05/13/2024 154  140 - 450 10*3/mm3 Final    Neutrophil % 05/13/2024 61.2  42.7 - 76.0 % Final    Lymphocyte % 05/13/2024 24.2  19.6 - 45.3 % Final    Monocyte % 05/13/2024 7.4  5.0 - 12.0 % Final    Eosinophil % 05/13/2024 6.4 (H)  0.3 - 6.2 % Final    Basophil % 05/13/2024 0.4  0.0 - 1.5 % Final    Immature Grans % 05/13/2024 0.4  0.0 - 0.5 % Final    Neutrophils, Absolute 05/13/2024 4.22  1.70 - 7.00 10*3/mm3 Final    Lymphocytes, Absolute 05/13/2024 1.67  0.70 - 3.10 10*3/mm3 Final    Monocytes, Absolute 05/13/2024 0.51  0.10 - 0.90 10*3/mm3 Final    Eosinophils, Absolute 05/13/2024 0.44 (H)  0.00 - 0.40 10*3/mm3 Final    Basophils, Absolute 05/13/2024 0.03  0.00 - 0.20 10*3/mm3 Final    Immature Grans, Absolute 05/13/2024 0.03  0.00 - 0.05 10*3/mm3 Final    nRBC 05/13/2024 0.0  0.0 - 0.2 /100 WBC Final    Extra Tube 05/13/2024 Hold for add-ons.   Final    Auto resulted.    Extra Tube 05/13/2024 hold for add-on   Final    Auto resulted    Extra Tube 05/13/2024 Hold for add-ons.   Final    Auto resulted.    Extra Tube 05/13/2024 Hold for add-ons.   Final    Auto resulted    Protime 05/13/2024 56.7 (C)  12.1 - 14.7 Seconds Final    INR 05/13/2024 6.50 (C)  0.90 - 1.10 Final    PTT 05/13/2024 60.2 (H)  26.5 - 34.5 seconds Final   Admission on 04/25/2024, Discharged on 04/25/2024   Component Date Value Ref Range Status    Glucose 04/25/2024 98  65 - 99 mg/dL Final    BUN 04/25/2024 18  8 - 23 mg/dL Final    Creatinine 04/25/2024 1.20  0.76 - 1.27 mg/dL Final    Sodium 04/25/2024 138  136 - 145 mmol/L Final    Potassium 04/25/2024 4.5  3.5 - 5.2 mmol/L Final    Chloride 04/25/2024 103  98 - 107 mmol/L Final    CO2 04/25/2024 26.4  22.0 - 29.0 mmol/L Final    Calcium 04/25/2024 9.1  8.2 - 9.6 mg/dL Final    Total Protein 04/25/2024  6.9  6.0 - 8.5 g/dL Final    Albumin 04/25/2024 3.7  3.5 - 5.2 g/dL Final    ALT (SGPT) 04/25/2024 12  1 - 41 U/L Final    AST (SGOT) 04/25/2024 18  1 - 40 U/L Final    Alkaline Phosphatase 04/25/2024 112  39 - 117 U/L Final    Total Bilirubin 04/25/2024 0.3  0.0 - 1.2 mg/dL Final    Globulin 04/25/2024 3.2  gm/dL Final    A/G Ratio 04/25/2024 1.2  g/dL Final    BUN/Creatinine Ratio 04/25/2024 15.0  7.0 - 25.0 Final    Anion Gap 04/25/2024 8.6  5.0 - 15.0 mmol/L Final    eGFR 04/25/2024 57.1 (L)  >60.0 mL/min/1.73 Final    PTT 04/25/2024 41.6 (H)  26.5 - 34.5 seconds Final    Protime 04/25/2024 31.4 (H)  12.1 - 14.7 Seconds Final    INR 04/25/2024 2.94 (H)  0.90 - 1.10 Final    WBC 04/25/2024 8.48  3.40 - 10.80 10*3/mm3 Final    RBC 04/25/2024 5.05  4.14 - 5.80 10*6/mm3 Final    Hemoglobin 04/25/2024 14.7  13.0 - 17.7 g/dL Final    Hematocrit 04/25/2024 46.5  37.5 - 51.0 % Final    MCV 04/25/2024 92.1  79.0 - 97.0 fL Final    MCH 04/25/2024 29.1  26.6 - 33.0 pg Final    MCHC 04/25/2024 31.6  31.5 - 35.7 g/dL Final    RDW 04/25/2024 15.1  12.3 - 15.4 % Final    RDW-SD 04/25/2024 50.4  37.0 - 54.0 fl Final    MPV 04/25/2024 10.2  6.0 - 12.0 fL Final    Platelets 04/25/2024 146  140 - 450 10*3/mm3 Final    Neutrophil % 04/25/2024 60.5  42.7 - 76.0 % Final    Lymphocyte % 04/25/2024 25.1  19.6 - 45.3 % Final    Monocyte % 04/25/2024 8.3  5.0 - 12.0 % Final    Eosinophil % 04/25/2024 5.0  0.3 - 6.2 % Final    Basophil % 04/25/2024 0.6  0.0 - 1.5 % Final    Immature Grans % 04/25/2024 0.5  0.0 - 0.5 % Final    Neutrophils, Absolute 04/25/2024 5.14  1.70 - 7.00 10*3/mm3 Final    Lymphocytes, Absolute 04/25/2024 2.13  0.70 - 3.10 10*3/mm3 Final    Monocytes, Absolute 04/25/2024 0.70  0.10 - 0.90 10*3/mm3 Final    Eosinophils, Absolute 04/25/2024 0.42 (H)  0.00 - 0.40 10*3/mm3 Final    Basophils, Absolute 04/25/2024 0.05  0.00 - 0.20 10*3/mm3 Final    Immature Grans, Absolute 04/25/2024 0.04  0.00 - 0.05 10*3/mm3 Final     "nRBC 04/25/2024 0.0  0.0 - 0.2 /100 WBC Final    Extra Tube 04/25/2024 Hold for add-ons.   Final    Auto resulted.    Extra Tube 04/25/2024 hold for add-on   Final    Auto resulted    Extra Tube 04/25/2024 Hold for add-ons.   Final    Auto resulted.    Extra Tube 04/25/2024 Hold for add-ons.   Final    Auto resulted    QT Interval 04/25/2024 386  ms Final    QTC Interval 04/25/2024 395  ms Final        Procedure:  Cath Change, Simple    Date/Time: 7/10/2024 4:02 PM    Performed by: Kushal Dixon PA-C  Authorized by: Kushal Dixon PA-C  Consent: Verbal consent obtained.  Risks and benefits: risks, benefits and alternatives were discussed  Consent given by: patient  Patient understanding: patient states understanding of the procedure being performed  Patient consent: the patient's understanding of the procedure matches consent given  Procedure consent: procedure consent matches procedure scheduled  Relevant documents: relevant documents present and verified  Test results: test results available and properly labeled  Site marked: the operative site was marked  Imaging studies: imaging studies available  Patient identity confirmed: verbally with patient  Time out: Immediately prior to procedure a \"time out\" was called to verify the correct patient, procedure, equipment, support staff and site/side marked as required.  Preparation: Patient was prepped and draped in the usual sterile fashion.  Local anesthesia used: no    Anesthesia:  Local anesthesia used: no    Sedation:  Patient sedated: no    Patient tolerance: patient tolerated the procedure well with no immediate complications           Assessment/Plan:   Problem List Items Addressed This Visit       Urinary catheter (Leos) change required - Primary       Health Maintenance:   Health Maintenance Due   Topic Date Due    ZOSTER VACCINE (1 of 2) Never done    RSV Vaccine - Adults (1 - 1-dose 60+ series) Never done    Pneumococcal Vaccine 65+ (1 of 1 - PCV) " Never done    ANNUAL WELLNESS VISIT  Never done    BMI FOLLOWUP  06/30/2023    COVID-19 Vaccine (3 - 2023-24 season) 09/01/2023        Smoking Counseling: Former smoker.  Never used smokeless tobacco.  Counseling given.    Urine Incontinence: Patient reports that he is not currently experiencing any symptoms of urinary incontinence.    Patient was given instructions and counseling regarding his condition or for health maintenance advice. Please see specific information pulled into the AVS if appropriate.    Patient Education:   Catheter exchange -I did change out the patient's 18 [place] Islander coudé catheter in office today and tolerated procedure well with no acute complications.  Will see him back in 1 month for repeat catheter change.    Visit Diagnoses:    ICD-10-CM ICD-9-CM   1. Urinary catheter (Leos) change required  Z46.6 V53.6       Meds Ordered During Visit:  No orders of the defined types were placed in this encounter.      Follow Up Appointment: 1 month  No follow-ups on file.      This document has been electronically signed by Kushal Dixon PA-C   July 10, 2024 16:03 EDT    Part of this note may be an electronic transcription/translation of spoken language to printed text using the Dragon Dictation System.

## 2024-08-07 ENCOUNTER — OFFICE VISIT (OUTPATIENT)
Dept: UROLOGY | Facility: CLINIC | Age: 89
End: 2024-08-07
Payer: MEDICARE

## 2024-08-07 VITALS
DIASTOLIC BLOOD PRESSURE: 89 MMHG | SYSTOLIC BLOOD PRESSURE: 149 MMHG | HEIGHT: 70 IN | HEART RATE: 80 BPM | WEIGHT: 176.2 LBS | BODY MASS INDEX: 25.22 KG/M2

## 2024-08-07 DIAGNOSIS — Z46.6 URINARY CATHETER (FOLEY) CHANGE REQUIRED: Primary | ICD-10-CM

## 2024-08-07 DIAGNOSIS — R33.8 URINARY RETENTION DUE TO BENIGN PROSTATIC HYPERPLASIA: ICD-10-CM

## 2024-08-07 DIAGNOSIS — N40.1 URINARY RETENTION DUE TO BENIGN PROSTATIC HYPERPLASIA: ICD-10-CM

## 2024-08-07 PROCEDURE — 87086 URINE CULTURE/COLONY COUNT: CPT

## 2024-08-07 PROCEDURE — 87088 URINE BACTERIA CULTURE: CPT

## 2024-08-07 PROCEDURE — 87186 SC STD MICRODIL/AGAR DIL: CPT

## 2024-08-07 PROCEDURE — 87077 CULTURE AEROBIC IDENTIFY: CPT

## 2024-08-07 NOTE — PROGRESS NOTES
"Chief Complaint:    Chief Complaint   Patient presents with    Urinary catheter (Leos) change required       Vital Signs:   /89 (BP Location: Right arm, Patient Position: Sitting, Cuff Size: Adult)   Pulse 80   Ht 177.8 cm (70\")   Wt 79.9 kg (176 lb 3.2 oz)   BMI 25.28 kg/m²   Body mass index is 25.28 kg/m².      HPI:  Stephenie Valdez Jr. is a 89 y.o. male who presents today for follow up    History of Present Illness  Mr. Valdez presents to the clinic for Leos catheter exchange.  He has a past medical history significant for BPH with retention, phimosis, and genital warts/penile cancer.  He is status post circumcision by Dr. Francis and is healed well.  He continues with chronic catheter changes monthly in office.  He has had no significant problems since last office change.  I did exchange his 18 French coudé catheter in office today and received yellow urine in return.  He does endorse some foul-smelling urine and is concerned for infection.  Will send this off in clinic.  He did have some mild bleeding post cath change however this stopped shortly after.  Advised him return to the clinic sooner if this continues or go to the nearest ER.  He verbalized understanding.      Past Medical History:  Past Medical History:   Diagnosis Date    Atrial fibrillation     Dementia     Enlarged prostate     Leos catheter in place     GERD (gastroesophageal reflux disease)     [place] (hard of hearing)     Renal disorder     Sleep apnea        Current Meds:  Current Outpatient Medications   Medication Sig Dispense Refill    clotrimazole-betamethasone (Lotrisone) 1-0.05 % cream Apply 1 application topically to the appropriate area as directed 2 (Two) Times a Day. 45 g 2    HYDROcodone-acetaminophen (NORCO) 5-325 MG per tablet Take 1-2 tablets by mouth Every 6 (Six) Hours As Needed (Pain). 12 tablet 0    imiquimod (ALDARA) 5 % cream Apply to warts 3 times weekly at bedtime.  Leave on for 6 to 10 hours. 12 each 1    warfarin " (COUMADIN) 4 MG tablet Take 1 tablet by mouth Daily.       No current facility-administered medications for this visit.        Allergies:   Allergies   Allergen Reactions    Penicillins Hives                      Past Surgical History:  Past Surgical History:   Procedure Laterality Date    CIRCUMCISION N/A 3/11/2024    Procedure: CIRCUMCISION, FULGERATION OF WARTS, AND BIOPSY;  Surgeon: Jorge Alberto Francis MD;  Location: [facility];  Service: Urology;  Laterality: N/A;    COLONOSCOPY         Social History:  Social History     Socioeconomic History    Marital status:    Tobacco Use    Smoking status: Former    Smokeless tobacco: Never   Vaping Use    Vaping status: Never Used   Substance and Sexual Activity    Alcohol use: Never    Drug use: Never    Sexual activity: Defer       Family History:  Family History   Problem Relation Age of Onset    No Known Problems Father     No Known Problems Mother        Review of Systems:  Review of Systems   Constitutional:  Positive for fatigue. Negative for chills, fever and unexpected weight change.   HENT:  Negative for congestion and sinus pressure.    Respiratory:  Negative for chest tightness and shortness of breath.    Cardiovascular:  Negative for chest pain.   Gastrointestinal:  Negative for abdominal pain, constipation, diarrhea, nausea and vomiting.   Genitourinary:  Positive for difficulty urinating. Negative for dysuria, flank pain, frequency, hematuria and urgency.   Musculoskeletal:  Negative for back pain and neck pain.   Skin:  Negative for rash.   Neurological:  Negative for dizziness and headaches.   Hematological:  Bruises/bleeds easily.   Psychiatric/Behavioral:  Negative for confusion and suicidal ideas. The patient is not nervous/anxious.        Physical Exam:  Physical Exam  Constitutional:       General: He is not in acute distress.     Appearance: Normal appearance.   HENT:      Head: Normocephalic and atraumatic.      Nose: Nose normal.       Mouth/Throat:      Mouth: Mucous membranes are moist.   Eyes:      Conjunctiva/sclera: Conjunctivae normal.   Cardiovascular:      Rate and Rhythm: Normal rate and regular rhythm.      Pulses: Normal pulses.      Heart sounds: Normal heart sounds.   Pulmonary:      Effort: Pulmonary effort is normal.      Breath sounds: Normal breath sounds.   Abdominal:      General: Bowel sounds are normal.      Palpations: Abdomen is soft.   Genitourinary:     Comments: 18 Occitan coudé catheter in place with yellow urine noted in collection tubing.  Foul smell odor to urine.  Musculoskeletal:         General: Normal range of motion.      Cervical back: Normal range of motion.   Skin:     General: Skin is warm.   Neurological:      General: No focal deficit present.      Mental Status: He is alert and oriented to person, place, and time.   Psychiatric:         Mood and Affect: Mood normal.         Behavior: Behavior normal.         Thought Content: Thought content normal.         Judgment: Judgment normal.             Recent Image (CT and/or KUB):   CT Abdomen and Pelvis: No results found for this or any previous visit.     CT Stone Protocol: Results for orders placed during the hospital encounter of 07/26/22    CT Abdomen Pelvis Stone Protocol    Narrative  CT Abdomen Pelvis WO    INDICATION:  Hematuria    TECHNIQUE:  CT of the abdomen and pelvis without IV contrast. Coronal and sagittal reconstructions were obtained.  Radiation dose reduction techniques included automated exposure control or exposure modulation based on body size. Count of known CT and cardiac nuc  med studies performed in previous 12 months: 1.    COMPARISON:  6/30/2022    FINDINGS:  Abdomen: Multiple hepatic cysts are again seen, unchanged. Nonspecific low-density splenic lesions are again noted, also unchanged. The pancreas is unremarkable. There is a 2 to 3 cm duodenal diverticulum again noted. Bilateral hydronephrosis is seen and  this has increased since  the previous examination. Both ureters are dilated down to the bladder but no ureteral stones are seen. The bladder is distended. There is a Leos catheter. The balloon is within the penile urethra. No evidence of retroperitoneal  adenopathy. No distended bowel loops.    Pelvis: Normal appendix. No adenopathy or pelvic mass.    Impression  The Leos catheter has slipped distally such that the balloon is in the penile urethra. The bladder is distended consistent with bladder outlet obstruction and there is bilateral hydronephrosis.          Signer Name: Ricky Arredondo MD  Signed: 7/26/2022 7:13 PM  Workstation Name: [id number]  [facility]     KUB: No results found for this or any previous visit.       Labs:  Brief Urine Lab Results  (Last result in the past 365 days)        Color   Clarity   Blood   Leuk Est   Nitrite   Protein   CREAT   Urine HCG        10/16/23 1957 Yellow   Clear   Small (1+)   Large (3+)   Positive   Negative                 Admission on 05/13/2024, Discharged on 05/13/2024   Component Date Value Ref Range Status    Glucose 05/13/2024 91  65 - 99 mg/dL Final    BUN 05/13/2024 17  8 - 23 mg/dL Final    Creatinine 05/13/2024 1.28 (H)  0.76 - 1.27 mg/dL Final    Sodium 05/13/2024 143  136 - 145 mmol/L Final    Potassium 05/13/2024 4.5  3.5 - 5.2 mmol/L Final    Slight hemolysis detected by analyzer. Result may be falsely elevated.    Chloride 05/13/2024 107  98 - 107 mmol/L Final    CO2 05/13/2024 27.3  22.0 - 29.0 mmol/L Final    Calcium 05/13/2024 9.2  8.2 - 9.6 mg/dL Final    Total Protein 05/13/2024 6.9  6.0 - 8.5 g/dL Final    Albumin 05/13/2024 3.9  3.5 - 5.2 g/dL Final    ALT (SGPT) 05/13/2024 13  1 - 41 U/L Final    AST (SGOT) 05/13/2024 19  1 - 40 U/L Final    Alkaline Phosphatase 05/13/2024 112  39 - 117 U/L Final    Total Bilirubin 05/13/2024 0.3  0.0 - 1.2 mg/dL Final    Globulin 05/13/2024 3.0  gm/dL Final    A/G Ratio 05/13/2024 1.3  g/dL Final     BUN/Creatinine Ratio 05/13/2024 13.3  7.0 - 25.0 Final    Anion Gap 05/13/2024 8.7  5.0 - 15.0 mmol/L Final    eGFR 05/13/2024 52.8 (L)  >60.0 mL/min/1.73 Final    WBC 05/13/2024 6.90  3.40 - 10.80 10*3/mm3 Final    RBC 05/13/2024 5.19  4.14 - 5.80 10*6/mm3 Final    Hemoglobin 05/13/2024 15.0  13.0 - 17.7 g/dL Final    Hematocrit 05/13/2024 46.5  37.5 - 51.0 % Final    MCV 05/13/2024 89.6  79.0 - 97.0 fL Final    MCH 05/13/2024 28.9  26.6 - 33.0 pg Final    MCHC 05/13/2024 32.3  31.5 - 35.7 g/dL Final    RDW 05/13/2024 15.2  12.3 - 15.4 % Final    RDW-SD 05/13/2024 49.7  37.0 - 54.0 fl Final    MPV 05/13/2024 9.9  6.0 - 12.0 fL Final    Platelets 05/13/2024 154  140 - 450 10*3/mm3 Final    Neutrophil % 05/13/2024 61.2  42.7 - 76.0 % Final    Lymphocyte % 05/13/2024 24.2  19.6 - 45.3 % Final    Monocyte % 05/13/2024 7.4  5.0 - 12.0 % Final    Eosinophil % 05/13/2024 6.4 (H)  0.3 - 6.2 % Final    Basophil % 05/13/2024 0.4  0.0 - 1.5 % Final    Immature Grans % 05/13/2024 0.4  0.0 - 0.5 % Final    Neutrophils, Absolute 05/13/2024 4.22  1.70 - 7.00 10*3/mm3 Final    Lymphocytes, Absolute 05/13/2024 1.67  0.70 - 3.10 10*3/mm3 Final    Monocytes, Absolute 05/13/2024 0.51  0.10 - 0.90 10*3/mm3 Final    Eosinophils, Absolute 05/13/2024 0.44 (H)  0.00 - 0.40 10*3/mm3 Final    Basophils, Absolute 05/13/2024 0.03  0.00 - 0.20 10*3/mm3 Final    Immature Grans, Absolute 05/13/2024 0.03  0.00 - 0.05 10*3/mm3 Final    nRBC 05/13/2024 0.0  0.0 - 0.2 /100 WBC Final    Extra Tube 05/13/2024 Hold for add-ons.   Final    Auto resulted.    Extra Tube 05/13/2024 hold for add-on   Final    Auto resulted    Extra Tube 05/13/2024 Hold for add-ons.   Final    Auto resulted.    Extra Tube 05/13/2024 Hold for add-ons.   Final    Auto resulted    Protime 05/13/2024 56.7 (C)  12.1 - 14.7 Seconds Final    INR 05/13/2024 6.50 (C)  0.90 - 1.10 Final    PTT 05/13/2024 60.2 (H)  26.5 - 34.5 seconds Final        Procedure:   Cath Change,  Simple    Date/Time: 8/7/2024 3:17 PM    Performed by: Kushal Dixon PA-C  Authorized by: Kushal Dixon PA-C  Consent: Verbal consent obtained.  Risks and benefits: risks, benefits and alternatives were discussed  Consent given by: patient  Patient understanding: patient states understanding of the procedure being performed  Patient consent: the patient's understanding of the procedure matches consent given  Procedure consent: procedure consent matches procedure scheduled  Relevant documents: relevant documents present and verified  Test results: test results available and properly labeled  Site marked: the operative site was marked  Imaging studies: imaging studies available  Patient identity confirmed: verbally with patient  Preparation: Patient was prepped and draped in the usual sterile fashion.  Local anesthesia used: no    Anesthesia:  Local anesthesia used: no    Sedation:  Patient sedated: no    Patient tolerance: patient tolerated the procedure well with no immediate complications  Comments: Mild bleeding post catheter exchange           I have reviewed and agree with the above PMH, PSH, FMH, social history, medications, allergies, and labs.     Assessment/Plan:   Problem List Items Addressed This Visit       Urinary retention due to benign prostatic hyperplasia    Relevant Orders    Urine Culture - Urine, Urine, Catheter    Urinary catheter (Leos) change required - Primary       Health Maintenance:   Health Maintenance Due   Topic Date Due    ZOSTER VACCINE (1 of 2) Never done    RSV Vaccine - Adults (1 - 1-dose 60+ series) Never done    Pneumococcal Vaccine 65+ (1 of 1 - PCV) Never done    ANNUAL WELLNESS VISIT  Never done    BMI FOLLOWUP  06/30/2023    COVID-19 Vaccine (3 - 2023-24 season) 09/01/2023    INFLUENZA VACCINE  08/01/2024        Smoking Counseling: Former Smoker. Never used smokeless tobacco.    Urine Incontinence: Patient reports that he is not currently experiencing any symptoms of  urinary incontinence.    Patient was given instructions and counseling regarding his condition or for health maintenance advice. Please see specific information pulled into the AVS if appropriate.    Patient Education:   Leos catheter exchange -I did exchange the patient's Leos catheter in office today.  Some bleeding was noted however stopped shortly after.  Patient has concern for acute infections I will send off for culture and call with results once obtained.  He is asymptomatic at this time so we will proceed forward with observation.  Advised him return to the clinic sooner or go to the nearest ER if he has any concerning symptoms.  He verbalized understanding.    Visit Diagnoses:    ICD-10-CM ICD-9-CM   1. Urinary catheter (Leos) change required  Z46.6 V53.6   2. Urinary retention due to benign prostatic hyperplasia  N40.1 600.91    R33.8 788.20     A total of 15 minutes were spent coordinating this patient’s care in clinic today; 10 minutes of which were face-to-face with the patient, reviewing medical history and counseling on the current treatment and followup plan.  All questions were answered to patient's satisfaction.    Meds Ordered During Visit:  No orders of the defined types were placed in this encounter.      Follow Up Appointment: 1 month  No follow-ups on file.      This document has been electronically signed by Kushal Dixon PA-C   August 7, 2024 15:18 EDT    Part of this note may be an electronic transcription/translation of spoken language to printed text using the Dragon Dictation System.

## 2024-08-11 LAB
BACTERIA SPEC AEROBE CULT: ABNORMAL
BACTERIA SPEC AEROBE CULT: ABNORMAL

## 2024-08-12 ENCOUNTER — TELEPHONE (OUTPATIENT)
Dept: UROLOGY | Facility: CLINIC | Age: 89
End: 2024-08-12
Payer: MEDICARE

## 2024-08-12 DIAGNOSIS — N39.0 URINARY TRACT INFECTION WITHOUT HEMATURIA, SITE UNSPECIFIED: Primary | ICD-10-CM

## 2024-08-12 RX ORDER — LEVOFLOXACIN 500 MG/1
500 TABLET, FILM COATED ORAL DAILY
Qty: 3 TABLET | Refills: 0 | Status: SHIPPED | OUTPATIENT
Start: 2024-08-12 | End: 2024-08-15

## 2024-08-12 NOTE — TELEPHONE ENCOUNTER
Called patient to discuss urine culture results however he did not answer.  Left voicemail going over results.  I will send in the medication for him to take for 3 days.  Vies to call back with questions or concerns.

## 2024-09-11 ENCOUNTER — OFFICE VISIT (OUTPATIENT)
Dept: UROLOGY | Facility: CLINIC | Age: 89
End: 2024-09-11
Payer: MEDICARE

## 2024-09-11 VITALS
HEART RATE: 105 BPM | SYSTOLIC BLOOD PRESSURE: 130 MMHG | WEIGHT: 181.4 LBS | HEIGHT: 70 IN | BODY MASS INDEX: 25.97 KG/M2 | DIASTOLIC BLOOD PRESSURE: 69 MMHG

## 2024-09-11 DIAGNOSIS — Z46.6 URINARY CATHETER (FOLEY) CHANGE REQUIRED: Primary | ICD-10-CM

## 2024-09-11 NOTE — PROGRESS NOTES
"Chief Complaint:    Chief Complaint   Patient presents with    Urinary catheter (Leos) change required       Vital Signs:   /69 (BP Location: Right arm, Patient Position: Sitting, Cuff Size: Adult)   Pulse 105   Ht 177.8 cm (70\")   Wt 82.3 kg (181 lb 6.4 oz)   BMI 26.03 kg/m²   Body mass index is 26.03 kg/m².      HPI:  Stephenie Valdez Jr. is a 89 y.o. male who presents today for follow up    History of Present Illness  Mr. Valdez returns to clinic today from a monthly follow-up for catheter exchange.  He denies any significant issues since last catheter exchange.  I did change out the patient's 16 French straight catheter with a 16 French straight in office today and he tolerated procedure well with no acute complications noted.  He did have some slight balanitis and recommend to continue with Lotrisone cream as needed.  He verbalized understanding          Past Medical History:  Past Medical History:   Diagnosis Date    Atrial fibrillation     Dementia     Enlarged prostate     Leos catheter in place     GERD (gastroesophageal reflux disease)     [place] (hard of hearing)     Renal disorder     Sleep apnea        Current Meds:  Current Outpatient Medications   Medication Sig Dispense Refill    clotrimazole-betamethasone (Lotrisone) 1-0.05 % cream Apply 1 application topically to the appropriate area as directed 2 (Two) Times a Day. 45 g 2    HYDROcodone-acetaminophen (NORCO) 5-325 MG per tablet Take 1-2 tablets by mouth Every 6 (Six) Hours As Needed (Pain). 12 tablet 0    imiquimod (ALDARA) 5 % cream Apply to warts 3 times weekly at bedtime.  Leave on for 6 to 10 hours. 12 each 1    warfarin (COUMADIN) 4 MG tablet Take 1 tablet by mouth Daily.       No current facility-administered medications for this visit.        Allergies:   Allergies   Allergen Reactions    Penicillins Hives                      Past Surgical History:  Past Surgical History:   Procedure Laterality Date    CIRCUMCISION N/A 3/11/2024    " Procedure: CIRCUMCISION, FULGERATION OF WARTS, AND BIOPSY;  Surgeon: Jorge Alberto Francis MD;  Location: [facility];  Service: Urology;  Laterality: N/A;    COLONOSCOPY         Social History:  Social History     Socioeconomic History    Marital status:    Tobacco Use    Smoking status: Former    Smokeless tobacco: Never   Vaping Use    Vaping status: Never Used   Substance and Sexual Activity    Alcohol use: Never    Drug use: Never    Sexual activity: Defer       Family History:  Family History   Problem Relation Age of Onset    No Known Problems Father     No Known Problems Mother        Review of Systems:  Review of Systems   Constitutional:  Positive for fatigue. Negative for chills, fever and unexpected weight change.   HENT:  Negative for congestion and sinus pressure.    Respiratory:  Negative for chest tightness and shortness of breath.    Cardiovascular:  Negative for chest pain.   Gastrointestinal:  Negative for abdominal pain, constipation, diarrhea, nausea and vomiting.   Genitourinary:  Positive for difficulty urinating. Negative for dysuria, flank pain, frequency, hematuria and urgency.   Musculoskeletal:  Negative for back pain and neck pain.   Skin:  Negative for rash.   Neurological:  Negative for dizziness and headaches.   Hematological:  Bruises/bleeds easily.   Psychiatric/Behavioral:  Negative for confusion and suicidal ideas. The patient is not nervous/anxious.        Physical Exam:  Physical Exam  Constitutional:       General: He is not in acute distress.     Appearance: Normal appearance.   HENT:      Head: Normocephalic and atraumatic.      Nose: Nose normal.      Mouth/Throat:      Mouth: Mucous membranes are moist.   Eyes:      Conjunctiva/sclera: Conjunctivae normal.   Cardiovascular:      Rate and Rhythm: Normal rate and regular rhythm.      Pulses: Normal pulses.      Heart sounds: Normal heart sounds.   Pulmonary:      Effort: Pulmonary effort is normal.      Breath sounds:  Normal breath sounds.   Abdominal:      General: Bowel sounds are normal.      Palpations: Abdomen is soft.   Genitourinary:     Comments: Well-healed circumcision.  Notable balanitis on penis.  Going Leos catheter with yellow urine in collection bag.  Musculoskeletal:         General: Normal range of motion.      Cervical back: Normal range of motion.   Skin:     General: Skin is warm.   Neurological:      General: No focal deficit present.      Mental Status: He is alert and oriented to person, place, and time.   Psychiatric:         Mood and Affect: Mood normal.         Behavior: Behavior normal.         Thought Content: Thought content normal.         Judgment: Judgment normal.           Recent Image (CT and/or KUB):   CT Abdomen and Pelvis: No results found for this or any previous visit.     CT Stone Protocol: Results for orders placed during the hospital encounter of 07/26/22    CT Abdomen Pelvis Stone Protocol    Narrative  CT Abdomen Pelvis WO    INDICATION:  Hematuria    TECHNIQUE:  CT of the abdomen and pelvis without IV contrast. Coronal and sagittal reconstructions were obtained.  Radiation dose reduction techniques included automated exposure control or exposure modulation based on body size. Count of known CT and cardiac nuc  med studies performed in previous 12 months: 1.    COMPARISON:  6/30/2022    FINDINGS:  Abdomen: Multiple hepatic cysts are again seen, unchanged. Nonspecific low-density splenic lesions are again noted, also unchanged. The pancreas is unremarkable. There is a 2 to 3 cm duodenal diverticulum again noted. Bilateral hydronephrosis is seen and  this has increased since the previous examination. Both ureters are dilated down to the bladder but no ureteral stones are seen. The bladder is distended. There is a Leos catheter. The balloon is within the penile urethra. No evidence of retroperitoneal  adenopathy. No distended bowel loops.    Pelvis: Normal appendix. No adenopathy or  "pelvic mass.    Impression  The Leos catheter has slipped distally such that the balloon is in the penile urethra. The bladder is distended consistent with bladder outlet obstruction and there is bilateral hydronephrosis.          Signer Name: Ricky Arredondo MD  Signed: 7/26/2022 7:13 PM  Workstation Name: [id number]  [facility]     KUB: No results found for this or any previous visit.       Labs:  Brief Urine Lab Results  (Last result in the past 365 days)        Color   Clarity   Blood   Leuk Est   Nitrite   Protein   CREAT   Urine HCG        10/16/23 1957 Yellow   Clear   Small (1+)   Large (3+)   Positive   Negative                 Office Visit on 08/07/2024   Component Date Value Ref Range Status    Urine Culture 08/07/2024 >100,000 CFU/mL Escherichia coli (A)   Final    Urine Culture 08/07/2024 >100,000 CFU/mL Acinetobacter baumannii (A)   Final        Procedure:  Cath Change, Simple    Date/Time: 9/11/2024 3:29 PM    Performed by: Kushal Dixon PA-C  Authorized by: Kushal Dixon PA-C  Consent: Verbal consent obtained.  Risks and benefits: risks, benefits and alternatives were discussed  Consent given by: patient  Patient understanding: patient states understanding of the procedure being performed  Patient consent: the patient's understanding of the procedure matches consent given  Procedure consent: procedure consent matches procedure scheduled  Relevant documents: relevant documents present and verified  Test results: test results available and properly labeled  Site marked: the operative site was marked  Imaging studies: imaging studies available  Patient identity confirmed: verbally with patient  Time out: Immediately prior to procedure a \"time out\" was called to verify the correct patient, procedure, equipment, support staff and site/side marked as required.  Preparation: Patient was prepped and draped in the usual sterile fashion.  Local anesthesia used: " yes    Anesthesia:  Local anesthesia used: yes  Local Anesthetic: topical anesthetic    Sedation:  Patient sedated: no    Patient tolerance: patient tolerated the procedure well with no immediate complications             I have reviewed and agree with the above PMH, PSH, FMH, social history, medications, allergies, and labs.     Assessment/Plan:   Problem List Items Addressed This Visit       Urinary catheter (Leos) change required - Primary       Health Maintenance:   Health Maintenance Due   Topic Date Due    ZOSTER VACCINE (1 of 2) Never done    RSV Vaccine - Adults (1 - 1-dose 60+ series) Never done    Pneumococcal Vaccine 65+ (1 of 1 - PCV) Never done    ANNUAL WELLNESS VISIT  Never done    BMI FOLLOWUP  06/30/2023    COVID-19 Vaccine (3 - 2023-24 season) 09/01/2024    INFLUENZA VACCINE  08/01/2024        Smoking Counseling: Former smoker.  Never used smokeless tobacco    Urine Incontinence: Patient reports that he is not currently experiencing any symptoms of urinary incontinence.    Patient was given instructions and counseling regarding his condition or for health maintenance advice. Please see specific information pulled into the AVS if appropriate.    Patient Education:   Urinary catheter exchange -did exchange the patient's 16 Indonesian straight catheter out in office today and he tolerated procedure well with no acute complications.  Will see him back in 1 month for repeat cath exchange or sooner if needed.  He verbalized understanding.    Visit Diagnoses:    ICD-10-CM ICD-9-CM   1. Urinary catheter (Leos) change required  Z46.6 V53.6       Meds Ordered During Visit:  No orders of the defined types were placed in this encounter.      Follow Up Appointment: 1 month  No follow-ups on file.      This document has been electronically signed by Kushal Dixon PA-C   September 11, 2024 15:29 EDT    Part of this note may be an electronic transcription/translation of spoken language to printed text using the  [facility] Dictation System.

## 2024-10-14 ENCOUNTER — OFFICE VISIT (OUTPATIENT)
Dept: UROLOGY | Facility: CLINIC | Age: 89
End: 2024-10-14
Payer: MEDICARE

## 2024-10-14 VITALS
BODY MASS INDEX: 25.91 KG/M2 | DIASTOLIC BLOOD PRESSURE: 90 MMHG | HEART RATE: 95 BPM | HEIGHT: 70 IN | SYSTOLIC BLOOD PRESSURE: 124 MMHG | WEIGHT: 181 LBS

## 2024-10-14 DIAGNOSIS — Z46.6 URINARY CATHETER (FOLEY) CHANGE REQUIRED: Primary | ICD-10-CM

## 2024-10-14 PROCEDURE — 51702 INSERT TEMP BLADDER CATH: CPT

## 2024-10-14 PROCEDURE — 1159F MED LIST DOCD IN RCRD: CPT

## 2024-10-14 PROCEDURE — 1160F RVW MEDS BY RX/DR IN RCRD: CPT

## 2024-10-14 NOTE — PROGRESS NOTES
"Chief Complaint:    Chief Complaint   Patient presents with    Urinary catheter (Leos) change required       Vital Signs:   /90   Pulse 95   Ht 177.8 cm (70\")   Wt 82.1 kg (181 lb)   BMI 25.97 kg/m²   Body mass index is 25.97 kg/m².      HPI:  Stephenie Valdez Jr. is a 89 y.o. male who presents today for follow up    History of Present Illness  Mr. Abernathy returns to the clinic today for follow-up for urinary catheter exchange.  He was last seen in office 1 month ago and underwent indwelling Leos catheter change and had some slight bleeding however since resolved.  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  He denies any significant issues since last office visit.  I did exchange the patient's 16 French catheter with a 16 French coudé.  He tolerated procedure well and received yellow urine in return.  No complications were noted.      Past Medical History:  Past Medical History:   Diagnosis Date    Atrial fibrillation     Dementia     Enlarged prostate     Leos catheter in place     GERD (gastroesophageal reflux disease)     [place] (hard of hearing)     Renal disorder     Sleep apnea        Current Meds:  Current Outpatient Medications   Medication Sig Dispense Refill    clotrimazole-betamethasone (Lotrisone) 1-0.05 % cream Apply 1 application topically to the appropriate area as directed 2 (Two) Times a Day. 45 g 2    HYDROcodone-acetaminophen (NORCO) 5-325 MG per tablet Take 1-2 tablets by mouth Every 6 (Six) Hours As Needed (Pain). 12 tablet 0    imiquimod (ALDARA) 5 % cream Apply to warts 3 times weekly at bedtime.  Leave on for 6 to 10 hours. 12 each 1    warfarin (COUMADIN) 4 MG tablet Take 1 tablet by mouth Daily.       No current facility-administered medications for this visit.        Allergies:   Allergies   Allergen Reactions    Penicillins Hives                      Past Surgical History:  Past Surgical History:   Procedure Laterality Date    " CIRCUMCISION N/A 3/11/2024    Procedure: CIRCUMCISION, FULGERATION OF WARTS, AND BIOPSY;  Surgeon: Jorge Alberto Francis MD;  Location: [facility];  Service: Urology;  Laterality: N/A;    COLONOSCOPY         Social History:  Social History     Socioeconomic History    Marital status:    Tobacco Use    Smoking status: Former    Smokeless tobacco: Never   Vaping Use    Vaping status: Never Used   Substance and Sexual Activity    Alcohol use: Never    Drug use: Never    Sexual activity: Defer       Family History:  Family History   Problem Relation Age of Onset    No Known Problems Father     No Known Problems Mother        Review of Systems:  Review of Systems   Constitutional:  Positive for fatigue. Negative for chills, fever and unexpected weight change.   HENT:  Negative for congestion and sinus pressure.    Respiratory:  Negative for chest tightness and shortness of breath.    Cardiovascular:  Negative for chest pain.   Gastrointestinal:  Negative for abdominal pain, constipation, diarrhea, nausea and vomiting.   Genitourinary:  Positive for difficulty urinating. Negative for dysuria, flank pain, frequency, hematuria and urgency.   Musculoskeletal:  Negative for back pain and neck pain.   Skin:  Negative for rash.   Neurological:  Negative for dizziness and headaches.   Hematological:  Bruises/bleeds easily.   Psychiatric/Behavioral:  Negative for confusion and suicidal ideas. The patient is not nervous/anxious.        Physical Exam:  Physical Exam  Constitutional:       General: He is not in acute distress.     Appearance: Normal appearance.   HENT:      Head: Normocephalic and atraumatic.      Nose: Nose normal.      Mouth/Throat:      Mouth: Mucous membranes are moist.   Eyes:      Conjunctiva/sclera: Conjunctivae normal.   Cardiovascular:      Rate and Rhythm: Normal rate and regular rhythm.      Pulses: Normal pulses.      Heart sounds: Normal heart sounds.   Pulmonary:      Effort: Pulmonary effort  is normal.      Breath sounds: Normal breath sounds.   Abdominal:      General: Bowel sounds are normal.      Palpations: Abdomen is soft.   Genitourinary:     Comments: 16 Hong Konger catheter in place with yellow urine in collection tubing.  Penile cancer present.  Musculoskeletal:         General: Normal range of motion.      Cervical back: Normal range of motion.   Skin:     General: Skin is warm.   Neurological:      General: No focal deficit present.      Mental Status: He is alert and oriented to person, place, and time.   Psychiatric:         Mood and Affect: Mood normal.         Behavior: Behavior normal.         Thought Content: Thought content normal.         Judgment: Judgment normal.           Recent Image (CT and/or KUB):   CT Abdomen and Pelvis: No results found for this or any previous visit.     CT Stone Protocol: Results for orders placed during the hospital encounter of 07/26/22    CT Abdomen Pelvis Stone Protocol    Narrative  CT Abdomen Pelvis WO    INDICATION:  Hematuria    TECHNIQUE:  CT of the abdomen and pelvis without IV contrast. Coronal and sagittal reconstructions were obtained.  Radiation dose reduction techniques included automated exposure control or exposure modulation based on body size. Count of known CT and cardiac nuc  med studies performed in previous 12 months: 1.    COMPARISON:  6/30/2022    FINDINGS:  Abdomen: Multiple hepatic cysts are again seen, unchanged. Nonspecific low-density splenic lesions are again noted, also unchanged. The pancreas is unremarkable. There is a 2 to 3 cm duodenal diverticulum again noted. Bilateral hydronephrosis is seen and  this has increased since the previous examination. Both ureters are dilated down to the bladder but no ureteral stones are seen. The bladder is distended. There is a Leos catheter. The balloon is within the penile urethra. No evidence of retroperitoneal  adenopathy. No distended bowel loops.    Pelvis: Normal appendix. No  "adenopathy or pelvic mass.    Impression  The Leos catheter has slipped distally such that the balloon is in the penile urethra. The bladder is distended consistent with bladder outlet obstruction and there is bilateral hydronephrosis.          Signer Name: Ricky Arredondo MD  Signed: 7/26/2022 7:13 PM  Workstation Name: [id number]  [facility]     KUB: No results found for this or any previous visit.       Labs:  Brief Urine Lab Results  (Last result in the past 365 days)        Color   Clarity   Blood   Leuk Est   Nitrite   Protein   CREAT   Urine HCG        10/16/23 1957 Yellow   Clear   Small (1+)   Large (3+)   Positive   Negative                 Office Visit on 08/07/2024   Component Date Value Ref Range Status    Urine Culture 08/07/2024 >100,000 CFU/mL Escherichia coli (A)   Final    Urine Culture 08/07/2024 >100,000 CFU/mL Acinetobacter baumannii (A)   Final        Procedure:  16 Belarusian coudé cath Change, Simple    Date/Time: 10/14/2024 4:50 PM    Performed by: Kushal Dixon PA-C  Authorized by: Kushal Dixon PA-C  Consent: Verbal consent obtained.  Risks and benefits: risks, benefits and alternatives were discussed  Consent given by: patient  Patient understanding: patient states understanding of the procedure being performed  Patient consent: the patient's understanding of the procedure matches consent given  Procedure consent: procedure consent matches procedure scheduled  Relevant documents: relevant documents present and verified  Test results: test results available and properly labeled  Site marked: the operative site was marked  Imaging studies: imaging studies available  Patient identity confirmed: verbally with patient  Time out: Immediately prior to procedure a \"time out\" was called to verify the correct patient, procedure, equipment, support staff and site/side marked as required.  Preparation: Patient was prepped and draped in the usual sterile fashion.  Local " anesthesia used: yes    Anesthesia:  Local anesthesia used: yes    Sedation:  Patient sedated: no    Patient tolerance: patient tolerated the procedure well with no immediate complications               Assessment/Plan:   Problem List Items Addressed This Visit       Urinary catheter (Leos) change required - Primary       Health Maintenance:   Health Maintenance Due   Topic Date Due    ZOSTER VACCINE (1 of 2) Never done    Pneumococcal Vaccine 65+ (1 of 1 - PCV) Never done    RSV Vaccine - Adults (1 - 1-dose 75+ series) Never done    ANNUAL WELLNESS VISIT  Never done    BMI FOLLOWUP  06/30/2023    INFLUENZA VACCINE  Never done    COVID-19 Vaccine (3 - 2023-24 season) 09/01/2024        Smoking Counseling: Former smoker.  Never used smokeless tobacco.  Counseling given.      Patient was given instructions and counseling regarding his condition or for health maintenance advice. Please see specific information pulled into the AVS if appropriate.    Patient Education:   Leos catheter exchange -I exchanged the patient's 16 Sami straight catheter with 16 Sami coudé and he tolerated procedure well with no acute complications.  Recommend to continue with observation at this time we will see him back in 1 month for repeat cath exchange    Visit Diagnoses:    ICD-10-CM ICD-9-CM   1. Urinary catheter (Leos) change required  Z46.6 V53.6       Meds Ordered During Visit:  No orders of the defined types were placed in this encounter.      Follow Up Appointment: 1 month  No follow-ups on file.      This document has been electronically signed by Kushal Dixon PA-C   October 14, 2024 16:50 EDT    Part of this note may be an electronic transcription/translation of spoken language to printed text using the Dragon Dictation System.

## 2024-11-13 ENCOUNTER — OFFICE VISIT (OUTPATIENT)
Dept: UROLOGY | Facility: CLINIC | Age: 89
End: 2024-11-13
Payer: MEDICARE

## 2024-11-13 VITALS
SYSTOLIC BLOOD PRESSURE: 133 MMHG | HEIGHT: 70 IN | BODY MASS INDEX: 25.86 KG/M2 | HEART RATE: 90 BPM | DIASTOLIC BLOOD PRESSURE: 88 MMHG | WEIGHT: 180.6 LBS

## 2024-11-13 DIAGNOSIS — C60.9 PENILE CANCER: ICD-10-CM

## 2024-11-13 DIAGNOSIS — Z46.6 URINARY CATHETER (FOLEY) CHANGE REQUIRED: Primary | ICD-10-CM

## 2024-11-13 NOTE — PROGRESS NOTES
"Chief Complaint:    Chief Complaint   Patient presents with    Urinary catheter (Leos) change required       Vital Signs:   /88 (BP Location: Right arm, Patient Position: Sitting, Cuff Size: Adult)   Pulse 90   Ht 177.8 cm (70\")   Wt 81.9 kg (180 lb 9.6 oz)   BMI 25.91 kg/m²   Body mass index is 25.91 kg/m².      HPI:  Stephenie Valdez Jr. is a 89 y.o. male who presents today for follow up    History of Present Illness  Mr. Abernathy returns to clinic today for monthly follow-up for cath exchange.  He has a past medical history of BPH with retention.  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  I did exchange his 16 French catheter in office today with no acute complications.  He is interested in possible undergoing excision of the tightness at the base of the penis due to previous circumcision.  I will discuss this with Dr. Francis and get him scheduled appropriately      Past Medical History:  Past Medical History:   Diagnosis Date    Atrial fibrillation     Dementia     Enlarged prostate     Leos catheter in place     GERD (gastroesophageal reflux disease)     [place] (hard of hearing)     Renal disorder     Sleep apnea        Current Meds:  Current Outpatient Medications   Medication Sig Dispense Refill    clotrimazole-betamethasone (Lotrisone) 1-0.05 % cream Apply 1 application topically to the appropriate area as directed 2 (Two) Times a Day. 45 g 2    HYDROcodone-acetaminophen (NORCO) 5-325 MG per tablet Take 1-2 tablets by mouth Every 6 (Six) Hours As Needed (Pain). 12 tablet 0    imiquimod (ALDARA) 5 % cream Apply to warts 3 times weekly at bedtime.  Leave on for 6 to 10 hours. 12 each 1    warfarin (COUMADIN) 4 MG tablet Take 1 tablet by mouth Daily.       No current facility-administered medications for this visit.        Allergies: "   Allergies   Allergen Reactions    Penicillins Hives                      Past Surgical History:  Past Surgical History:   Procedure Laterality Date    CIRCUMCISION N/A 3/11/2024    Procedure: CIRCUMCISION, FULGERATION OF WARTS, AND BIOPSY;  Surgeon: Jorge Alberto Francis MD;  Location: [facility];  Service: Urology;  Laterality: N/A;    COLONOSCOPY         Social History:  Social History     Socioeconomic History    Marital status:    Tobacco Use    Smoking status: Former    Smokeless tobacco: Never   Vaping Use    Vaping status: Never Used   Substance and Sexual Activity    Alcohol use: Never    Drug use: Never    Sexual activity: Defer       Family History:  Family History   Problem Relation Age of Onset    No Known Problems Father     No Known Problems Mother        Review of Systems:  Review of Systems   Constitutional:  Positive for fatigue. Negative for chills, fever and unexpected weight change.   HENT:  Negative for congestion and sinus pressure.    Respiratory:  Negative for chest tightness and shortness of breath.    Cardiovascular:  Negative for chest pain.   Gastrointestinal:  Negative for abdominal pain, constipation, diarrhea, nausea and vomiting.   Genitourinary:  Positive for difficulty urinating. Negative for dysuria, flank pain, frequency, hematuria and urgency.   Musculoskeletal:  Negative for back pain and neck pain.   Skin:  Negative for rash.   Neurological:  Negative for dizziness and headaches.   Hematological:  Bruises/bleeds easily.   Psychiatric/Behavioral:  Negative for confusion and suicidal ideas. The patient is not nervous/anxious.        Physical Exam:  Physical Exam  Constitutional:       General: He is not in acute distress.     Appearance: Normal appearance.   HENT:      Head: Normocephalic and atraumatic.      Nose: Nose normal.      Mouth/Throat:      Mouth: Mucous membranes are moist.   Eyes:      Conjunctiva/sclera: Conjunctivae normal.   Cardiovascular:      Rate and  Rhythm: Normal rate and regular rhythm.      Pulses: Normal pulses.      Heart sounds: Normal heart sounds.   Pulmonary:      Effort: Pulmonary effort is normal.      Breath sounds: Normal breath sounds.   Abdominal:      General: Bowel sounds are normal.      Palpations: Abdomen is soft.   Genitourinary:     Comments: Penile cancer.  16 Uzbek catheter in place with good urinary output noted  Musculoskeletal:         General: Normal range of motion.      Cervical back: Normal range of motion.   Skin:     General: Skin is warm.   Neurological:      General: No focal deficit present.      Mental Status: He is alert and oriented to person, place, and time.   Psychiatric:         Mood and Affect: Mood normal.         Behavior: Behavior normal.         Thought Content: Thought content normal.         Judgment: Judgment normal.           Recent Image (CT and/or KUB):   CT Abdomen and Pelvis: No results found for this or any previous visit.     CT Stone Protocol: Results for orders placed during the hospital encounter of 07/26/22    CT Abdomen Pelvis Stone Protocol    Narrative  CT Abdomen Pelvis WO    INDICATION:  Hematuria    TECHNIQUE:  CT of the abdomen and pelvis without IV contrast. Coronal and sagittal reconstructions were obtained.  Radiation dose reduction techniques included automated exposure control or exposure modulation based on body size. Count of known CT and cardiac nuc  med studies performed in previous 12 months: 1.    COMPARISON:  6/30/2022    FINDINGS:  Abdomen: Multiple hepatic cysts are again seen, unchanged. Nonspecific low-density splenic lesions are again noted, also unchanged. The pancreas is unremarkable. There is a 2 to 3 cm duodenal diverticulum again noted. Bilateral hydronephrosis is seen and  this has increased since the previous examination. Both ureters are dilated down to the bladder but no ureteral stones are seen. The bladder is distended. There is a Leos catheter. The balloon is  "within the penile urethra. No evidence of retroperitoneal  adenopathy. No distended bowel loops.    Pelvis: Normal appendix. No adenopathy or pelvic mass.    Impression  The Leos catheter has slipped distally such that the balloon is in the penile urethra. The bladder is distended consistent with bladder outlet obstruction and there is bilateral hydronephrosis.          Signer Name: Ricky Arredondo MD  Signed: 7/26/2022 7:13 PM  Workstation Name: [id number]  [facility]     KUB: No results found for this or any previous visit.       Labs:  Brief Urine Lab Results       None          No visits with results within 3 Month(s) from this visit.   Latest known visit with results is:   Office Visit on 08/07/2024   Component Date Value Ref Range Status    Urine Culture 08/07/2024 >100,000 CFU/mL Escherichia coli (A)   Final    Urine Culture 08/07/2024 >100,000 CFU/mL Acinetobacter baumannii (A)   Final        Procedure:  Cath Change, simple    Date/Time: 11/13/2024 3:33 PM    Performed by: Kushal Dixon PA-C  Authorized by: Kushal Dixon PA-C  Consent: Verbal consent obtained.  Risks and benefits: risks, benefits and alternatives were discussed  Consent given by: patient  Patient understanding: patient states understanding of the procedure being performed  Patient consent: the patient's understanding of the procedure matches consent given  Procedure consent: procedure consent matches procedure scheduled  Relevant documents: relevant documents present and verified  Test results: test results available and properly labeled  Site marked: the operative site was marked  Imaging studies: imaging studies available  Patient identity confirmed: verbally with patient  Time out: Immediately prior to procedure a \"time out\" was called to verify the correct patient, procedure, equipment, support staff and site/side marked as required.  Preparation: Patient was prepped and draped in the usual sterile " fashion.  Local anesthesia used: no    Anesthesia:  Local anesthesia used: no    Sedation:  Patient sedated: no    Patient tolerance: patient tolerated the procedure well with no immediate complications           Assessment/Plan:   Problem List Items Addressed This Visit       Urinary catheter (Leos) change required - Primary    Penile cancer       Health Maintenance:   Health Maintenance Due   Topic Date Due    ZOSTER VACCINE (1 of 2) Never done    Pneumococcal Vaccine 65+ (1 of 1 - PCV) Never done    RSV Vaccine - Adults (1 - 1-dose 75+ series) Never done    ANNUAL WELLNESS VISIT  Never done    BMI FOLLOWUP  06/30/2023    INFLUENZA VACCINE  Never done    COVID-19 Vaccine (3 - 2024-25 season) 09/01/2024        Smoking Counseling: Former smoker.  Never used smokeless tobacco.    Urine Incontinence: Patient reports that he is not currently experiencing any symptoms of urinary incontinence.    Patient was given instructions and counseling regarding his condition or for health maintenance advice. Please see specific information pulled into the AVS if appropriate.    Patient Education:   Urinary catheter exchange -I did exchange the patient's catheter in office today with 16 Wallisian straight catheter and he tolerated it well with no acute complications.  Recommend to repeat follow-up in 1 month for cath exchange.  He verbalized understanding  Penile cancer/phimosis -patient is interested in undergoing a fulguration in office as well as possible excision of previous skin from circumcision.  Advised him we will have to discontinue blood thinners.  I will discuss this with Dr. Francis and get him scheduled appropriately in office or the OR.    Visit Diagnoses:    ICD-10-CM ICD-9-CM   1. Urinary catheter (Leos) change required  Z46.6 V53.6   2. Penile cancer  C60.9 187.4       Meds Ordered During Visit:  No orders of the defined types were placed in this encounter.      Follow Up Appointment:   No follow-ups on file.  1  month      This document has been electronically signed by Kushal Dixon PA-C   November 13, 2024 15:34 EST    Part of this note may be an electronic transcription/translation of spoken language to printed text using the Dragon Dictation System.

## 2024-11-19 ENCOUNTER — HOSPITAL ENCOUNTER (EMERGENCY)
Facility: HOSPITAL | Age: 89
Discharge: HOME | End: 2024-11-19
Attending: STUDENT IN AN ORGANIZED HEALTH CARE EDUCATION/TRAINING PROGRAM | Admitting: STUDENT IN AN ORGANIZED HEALTH CARE EDUCATION/TRAINING PROGRAM
Payer: MEDICARE

## 2024-11-19 ENCOUNTER — APPOINTMENT (OUTPATIENT)
Dept: ULTRASOUND IMAGING | Facility: HOSPITAL | Age: 89
End: 2024-11-19
Payer: MEDICARE

## 2024-11-19 VITALS
DIASTOLIC BLOOD PRESSURE: 76 MMHG | BODY MASS INDEX: 25.77 KG/M2 | HEART RATE: 98 BPM | TEMPERATURE: 98.3 F | WEIGHT: 180 LBS | RESPIRATION RATE: 18 BRPM | OXYGEN SATURATION: 97 % | SYSTOLIC BLOOD PRESSURE: 114 MMHG | HEIGHT: 70 IN

## 2024-11-19 DIAGNOSIS — I82.4Y1 ACUTE DEEP VEIN THROMBOSIS (DVT) OF PROXIMAL VEIN OF RIGHT LOWER EXTREMITY: Primary | ICD-10-CM

## 2024-11-19 LAB
ALBUMIN SERPL-MCNC: 4.2 G/DL (ref 3.5–5.2)
ALBUMIN/GLOB SERPL: 1.2 G/DL
ALP SERPL-CCNC: 106 U/L (ref 39–117)
ALT SERPL W P-5'-P-CCNC: 13 U/L (ref 1–41)
ANION GAP SERPL CALCULATED.3IONS-SCNC: 11.3 MMOL/L (ref 5–15)
AST SERPL-CCNC: 19 U/L (ref 1–40)
BASOPHILS # BLD AUTO: 0.04 10*3/MM3 (ref 0–0.2)
BASOPHILS NFR BLD AUTO: 0.5 % (ref 0–1.5)
BILIRUB SERPL-MCNC: 0.5 MG/DL (ref 0–1.2)
BUN SERPL-MCNC: 22 MG/DL (ref 8–23)
BUN/CREAT SERPL: 15.1 (ref 7–25)
CALCIUM SPEC-SCNC: 9.6 MG/DL (ref 8.2–9.6)
CHLORIDE SERPL-SCNC: 100 MMOL/L (ref 98–107)
CK SERPL-CCNC: 79 U/L (ref 20–200)
CO2 SERPL-SCNC: 25.7 MMOL/L (ref 22–29)
CREAT SERPL-MCNC: 1.46 MG/DL (ref 0.76–1.27)
D-LACTATE SERPL-SCNC: 1.8 MMOL/L (ref 0.5–2)
DEPRECATED RDW RBC AUTO: 50.7 FL (ref 37–54)
EGFRCR SERPLBLD CKD-EPI 2021: 44.8 ML/MIN/1.73
EOSINOPHIL # BLD AUTO: 0.55 10*3/MM3 (ref 0–0.4)
EOSINOPHIL NFR BLD AUTO: 6.7 % (ref 0.3–6.2)
ERYTHROCYTE [DISTWIDTH] IN BLOOD BY AUTOMATED COUNT: 15.1 % (ref 12.3–15.4)
GLOBULIN UR ELPH-MCNC: 3.6 GM/DL
GLUCOSE SERPL-MCNC: 111 MG/DL (ref 65–99)
HCT VFR BLD AUTO: 52.6 % (ref 37.5–51)
HGB BLD-MCNC: 16.5 G/DL (ref 13–17.7)
HOLD SPECIMEN: NORMAL
HOLD SPECIMEN: NORMAL
IMM GRANULOCYTES # BLD AUTO: 0.12 10*3/MM3 (ref 0–0.05)
IMM GRANULOCYTES NFR BLD AUTO: 1.5 % (ref 0–0.5)
INR PPP: 2.96 (ref 0.9–1.1)
LYMPHOCYTES # BLD AUTO: 1.64 10*3/MM3 (ref 0.7–3.1)
LYMPHOCYTES NFR BLD AUTO: 19.9 % (ref 19.6–45.3)
MCH RBC QN AUTO: 28.7 PG (ref 26.6–33)
MCHC RBC AUTO-ENTMCNC: 31.4 G/DL (ref 31.5–35.7)
MCV RBC AUTO: 91.5 FL (ref 79–97)
MONOCYTES # BLD AUTO: 0.56 10*3/MM3 (ref 0.1–0.9)
MONOCYTES NFR BLD AUTO: 6.8 % (ref 5–12)
NEUTROPHILS NFR BLD AUTO: 5.33 10*3/MM3 (ref 1.7–7)
NEUTROPHILS NFR BLD AUTO: 64.6 % (ref 42.7–76)
NRBC BLD AUTO-RTO: 0 /100 WBC (ref 0–0.2)
PLATELET # BLD AUTO: 176 10*3/MM3 (ref 140–450)
PMV BLD AUTO: 9.2 FL (ref 6–12)
POTASSIUM SERPL-SCNC: 4.5 MMOL/L (ref 3.5–5.2)
PROT SERPL-MCNC: 7.8 G/DL (ref 6–8.5)
PROTHROMBIN TIME: 30.8 SECONDS (ref 12.1–14.7)
RBC # BLD AUTO: 5.75 10*6/MM3 (ref 4.14–5.8)
SODIUM SERPL-SCNC: 137 MMOL/L (ref 136–145)
TSH SERPL DL<=0.05 MIU/L-ACNC: 4.17 UIU/ML (ref 0.27–4.2)
WBC NRBC COR # BLD AUTO: 8.24 10*3/MM3 (ref 3.4–10.8)
WHOLE BLOOD HOLD COAG: NORMAL
WHOLE BLOOD HOLD SPECIMEN: NORMAL

## 2024-11-19 PROCEDURE — 85025 COMPLETE CBC W/AUTO DIFF WBC: CPT | Performed by: STUDENT IN AN ORGANIZED HEALTH CARE EDUCATION/TRAINING PROGRAM

## 2024-11-19 PROCEDURE — 82550 ASSAY OF CK (CPK): CPT | Performed by: STUDENT IN AN ORGANIZED HEALTH CARE EDUCATION/TRAINING PROGRAM

## 2024-11-19 PROCEDURE — 99284 EMERGENCY DEPT VISIT MOD MDM: CPT

## 2024-11-19 PROCEDURE — 84443 ASSAY THYROID STIM HORMONE: CPT | Performed by: STUDENT IN AN ORGANIZED HEALTH CARE EDUCATION/TRAINING PROGRAM

## 2024-11-19 PROCEDURE — 80053 COMPREHEN METABOLIC PANEL: CPT | Performed by: STUDENT IN AN ORGANIZED HEALTH CARE EDUCATION/TRAINING PROGRAM

## 2024-11-19 PROCEDURE — 85610 PROTHROMBIN TIME: CPT | Performed by: STUDENT IN AN ORGANIZED HEALTH CARE EDUCATION/TRAINING PROGRAM

## 2024-11-19 PROCEDURE — 93970 EXTREMITY STUDY: CPT | Performed by: RADIOLOGY

## 2024-11-19 PROCEDURE — 93970 EXTREMITY STUDY: CPT

## 2024-11-19 PROCEDURE — 83605 ASSAY OF LACTIC ACID: CPT | Performed by: STUDENT IN AN ORGANIZED HEALTH CARE EDUCATION/TRAINING PROGRAM

## 2024-11-19 PROCEDURE — 36415 COLL VENOUS BLD VENIPUNCTURE: CPT

## 2024-11-19 NOTE — ED NOTES
Patient remains in ER lobby. Patient explained wait due to high patient volume, patient voices understanding. Patient denies change in condition. Patient instructed to notify ER staff of any needs or concerns. Patient voices understanding. AYAN

## 2024-11-19 NOTE — ED TRIAGE NOTES
MEDICAL SCREENING:    Reason for Visit: right leg pain    Patient initially seen in triage.  The patient was advised further evaluation and diagnostic testing will be needed, some of the treatment and testing will be initiated in the lobby in order to begin the process.  The patient will be returned to the waiting area for the time being and possibly be re-assessed by a subsequent ED provider.  The patient will be brought back to the treatment area in as timely manner as possible.

## 2024-11-20 NOTE — ED PROVIDER NOTES
Subjective   History of Present Illness  Patient is a 89-year-old male who presents today wanting an ultrasound of his leg.  Patient reports that he was seen at his family doctor today and they told him that he should be seen to ensure the blood clot is not moving.  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  Patient reports he has been on Coumadin for over 1 year and just periodically has his leg rechecked.  Patient denies any new symptoms or complaints today.  Patient presents private vehicle with his wife.        Review of Systems   Constitutional: Negative.  Negative for fever.   HENT: Negative.     Respiratory: Negative.     Cardiovascular: Negative.  Negative for chest pain.   Gastrointestinal: Negative.  Negative for abdominal pain.   Endocrine: Negative.    Genitourinary: Negative.  Negative for dysuria.   Skin: Negative.    Neurological: Negative.    Psychiatric/Behavioral: Negative.     All other systems reviewed and are negative.      Past Medical History:   Diagnosis Date    Atrial fibrillation     Dementia     Enlarged prostate     Leos catheter in place     GERD (gastroesophageal reflux disease)     [place] (hard of hearing)     Renal disorder     Sleep apnea        Allergies   Allergen Reactions    Penicillins Hives                     Past Surgical History:   Procedure Laterality Date    CIRCUMCISION N/A 3/11/2024    Procedure: CIRCUMCISION, FULGERATION OF WARTS, AND BIOPSY;  Surgeon: Jorge Alberto Francis MD;  Location: [facility] OR;  Service: Urology;  Laterality: N/A;    COLONOSCOPY         Family History   Problem Relation Age of Onset    No Known Problems Father     No Known Problems Mother        Social History     Socioeconomic History    Marital status:    Tobacco Use    Smoking status: Former    Smokeless tobacco: Never   Vaping Use    Vaping status: Never Used   Substance and Sexual Activity    Alcohol use: Never    Drug use: Never    Sexual activity: Defer            Objective   Physical Exam  Vitals and nursing note reviewed.   Constitutional:       General: He is not in acute distress.     Appearance: He is well-developed. He is not diaphoretic.   HENT:      Head: Normocephalic and atraumatic.      Right Ear: External ear normal.      Left Ear: External ear normal.      Nose: Nose normal.   Eyes:      Conjunctiva/sclera: Conjunctivae normal.      Pupils: Pupils are equal, round, and reactive to light.   Neck:      Vascular: No JVD.      Trachea: No tracheal deviation.   Cardiovascular:      Rate and Rhythm: Normal rate and regular rhythm.      Heart sounds: Normal heart sounds. No murmur heard.  Pulmonary:      Effort: Pulmonary effort is normal. No respiratory distress.      Breath sounds: Normal breath sounds. No wheezing.   Abdominal:      General: Bowel sounds are normal.      Palpations: Abdomen is soft.      Tenderness: There is no abdominal tenderness.   Musculoskeletal:         General: No deformity. Normal range of motion.      Cervical back: Normal range of motion and neck supple.   Skin:     General: Skin is warm and dry.      Coloration: Skin is not pale.      Findings: No erythema or rash.   Neurological:      Mental Status: He is alert and oriented to person, place, and time.      Cranial Nerves: No cranial nerve deficit.   Psychiatric:         Behavior: Behavior normal.         Thought Content: Thought content normal.       Results for orders placed or performed during the hospital encounter of 11/19/24   Comprehensive Metabolic Panel    Collection Time: 11/19/24  3:50 PM    Specimen: Blood   Result Value Ref Range    Glucose 111 (H) 65 - 99 mg/dL    BUN 22 8 - 23 mg/dL    Creatinine 1.46 (H) 0.76 - 1.27 mg/dL    Sodium 137 136 - 145 mmol/L    Potassium 4.5 3.5 - 5.2 mmol/L    Chloride 100 98 - 107 mmol/L    CO2 25.7 22.0 - 29.0 mmol/L    Calcium 9.6 8.2 - 9.6 mg/dL    Total Protein 7.8 6.0 - 8.5 g/dL    Albumin 4.2 3.5 - 5.2 g/dL    ALT (SGPT) 13 1 -  41 U/L    AST (SGOT) 19 1 - 40 U/L    Alkaline Phosphatase 106 39 - 117 U/L    Total Bilirubin 0.5 0.0 - 1.2 mg/dL    Globulin 3.6 gm/dL    A/G Ratio 1.2 g/dL    BUN/Creatinine Ratio 15.1 7.0 - 25.0    Anion Gap 11.3 5.0 - 15.0 mmol/L    eGFR 44.8 (L) >60.0 mL/min/1.73   Protime-INR    Collection Time: 11/19/24  3:50 PM    Specimen: Blood   Result Value Ref Range    Protime 30.8 (H) 12.1 - 14.7 Seconds    INR 2.96 (H) 0.90 - 1.10   Lactic Acid, Plasma    Collection Time: 11/19/24  3:50 PM    Specimen: Blood   Result Value Ref Range    Lactate 1.8 0.5 - 2.0 mmol/L   TSH    Collection Time: 11/19/24  3:50 PM    Specimen: Blood   Result Value Ref Range    TSH 4.170 0.270 - 4.200 uIU/mL   CK    Collection Time: 11/19/24  3:50 PM    Specimen: Blood   Result Value Ref Range    Creatine Kinase 79 20 - 200 U/L   CBC Auto Differential    Collection Time: 11/19/24  3:50 PM    Specimen: Blood   Result Value Ref Range    WBC 8.24 3.40 - 10.80 10*3/mm3    RBC 5.75 4.14 - 5.80 10*6/mm3    Hemoglobin 16.5 13.0 - 17.7 g/dL    Hematocrit 52.6 (H) 37.5 - 51.0 %    MCV 91.5 79.0 - 97.0 fL    MCH 28.7 26.6 - 33.0 pg    MCHC 31.4 (L) 31.5 - 35.7 g/dL    RDW 15.1 12.3 - 15.4 %    RDW-SD 50.7 37.0 - 54.0 fl    MPV 9.2 6.0 - 12.0 fL    Platelets 176 140 - 450 10*3/mm3    Neutrophil % 64.6 42.7 - 76.0 %    Lymphocyte % 19.9 19.6 - 45.3 %    Monocyte % 6.8 5.0 - 12.0 %    Eosinophil % 6.7 (H) 0.3 - 6.2 %    Basophil % 0.5 0.0 - 1.5 %    Immature Grans % 1.5 (H) 0.0 - 0.5 %    Neutrophils, Absolute 5.33 1.70 - 7.00 10*3/mm3    Lymphocytes, Absolute 1.64 0.70 - 3.10 10*3/mm3    Monocytes, Absolute 0.56 0.10 - 0.90 10*3/mm3    Eosinophils, Absolute 0.55 (H) 0.00 - 0.40 10*3/mm3    Basophils, Absolute 0.04 0.00 - 0.20 10*3/mm3    Immature Grans, Absolute 0.12 (H) 0.00 - 0.05 10*3/mm3    nRBC 0.0 0.0 - 0.2 /100 WBC   Green Top (Gel)    Collection Time: 11/19/24  3:50 PM   Result Value Ref Range    Extra Tube Hold for add-ons.    Lavender Top     Collection Time: 11/19/24  3:50 PM   Result Value Ref Range    Extra Tube hold for add-on    Gold Top - SST    Collection Time: 11/19/24  3:50 PM   Result Value Ref Range    Extra Tube Hold for add-ons.    Light Blue Top    Collection Time: 11/19/24  3:50 PM   Result Value Ref Range    Extra Tube Hold for add-ons.      US Venous Doppler Lower Extremity Bilateral (duplex)    Result Date: 11/19/2024  Diffuse right lower extremity DVT  This report was finalized on 11/19/2024 4:36 PM by Dr. Bahman García MD.         Procedures           ED Course  ED Course as of 11/19/24 1913 Tue Nov 19, 2024 1805 Spoke with Dr. Castellanos with hematology who states they would be better if patient was able to take a Eliquis or Xarelto.  Patient's wife reports that unfortunately they do not have prescription insurance and they are unable to afford the cost of these medications which is why patient is on warfarin.  Patient's INR noted to be within therapeutic range at 2.96.  DVT in right leg has not changed from previous.  Patient to be discharged home and follow-up as already directed. [KH]      ED Course User Index  [KH] Malissa Lorenzo, APRN                                                       Medical Decision Making  Patient is a 89-year-old male who presents today wanting an ultrasound of his leg.  Patient reports that he was seen at his family doctor today and they told him that he should be seen to ensure the blood clot is not moving.  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  Patient reports he has been on Coumadin for over 1 year and just periodically has his leg rechecked.  Patient denies any new symptoms or complaints today.  Patient presents private vehicle with his wife.    Problems Addressed:  Acute deep vein thrombosis (DVT) of proximal vein of right lower extremity: complicated acute illness or injury    Amount and/or Complexity of Data Reviewed  Labs: ordered.        Final diagnoses:   Acute  deep vein thrombosis (DVT) of proximal vein of right lower extremity       ED Disposition  ED Disposition       ED Disposition   Discharge    Condition   Stable    Comment   --               Babar Marcus MD  [address] [facility]  [employer] [facility] [id number] [phone]    Schedule an appointment as soon as possible for a visit   As needed    [facility] EMERGENCY DEPARTMENT  [address] [place] [id number] [phone]  Go to   If symptoms worsen         Medication List      No changes were made to your prescriptions during this visit.            Malissa Lorenzo, APRN  11/19/24 1914

## 2024-12-11 ENCOUNTER — OFFICE VISIT (OUTPATIENT)
Dept: UROLOGY | Facility: CLINIC | Age: 89
End: 2024-12-11
Payer: MEDICARE

## 2024-12-11 VITALS
HEART RATE: 97 BPM | BODY MASS INDEX: 26.2 KG/M2 | SYSTOLIC BLOOD PRESSURE: 147 MMHG | WEIGHT: 183 LBS | HEIGHT: 70 IN | DIASTOLIC BLOOD PRESSURE: 88 MMHG

## 2024-12-11 DIAGNOSIS — C60.9 PENILE CANCER: ICD-10-CM

## 2024-12-11 DIAGNOSIS — Z46.6 URINARY CATHETER (FOLEY) CHANGE REQUIRED: Primary | ICD-10-CM

## 2024-12-11 NOTE — PROGRESS NOTES
"Chief Complaint:    Chief Complaint   Patient presents with    Urinary catheter (Leos) change required       Vital Signs:   /88 (BP Location: Right arm, Patient Position: Sitting, Cuff Size: Adult)   Pulse 97   Ht 177.8 cm (70\")   Wt 83 kg (183 lb)   BMI 26.26 kg/m²   Body mass index is 26.26 kg/m².      HPI:  Stephenie Valdez Jr. is a 89 y.o. male who presents today for follow up    History of Present Illness  Mr. Valdez returns to clinic today for monthly follow-up for cath exchange.  He has a past medical history of BPH with retention.  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  I did exchange his 16 French catheter in office today with no acute complications.  He is interested in possible undergoing excision of the tightness at the base of the penis due to previous circumcision.  Get him scheduled for a monitor fulguration in office with Dr. Francis on 20 January.      Past Medical History:  Past Medical History:   Diagnosis Date    Atrial fibrillation     Dementia     Enlarged prostate     Leos catheter in place     GERD (gastroesophageal reflux disease)     [place] (hard of hearing)     Renal disorder     Sleep apnea        Current Meds:  Current Outpatient Medications   Medication Sig Dispense Refill    clotrimazole-betamethasone (Lotrisone) 1-0.05 % cream Apply 1 application topically to the appropriate area as directed 2 (Two) Times a Day. 45 g 2    HYDROcodone-acetaminophen (NORCO) 5-325 MG per tablet Take 1-2 tablets by mouth Every 6 (Six) Hours As Needed (Pain). 12 tablet 0    imiquimod (ALDARA) 5 % cream Apply to warts 3 times weekly at bedtime.  Leave on for 6 to 10 hours. 12 each 1    warfarin (COUMADIN) 4 MG tablet Take 1 tablet by mouth Daily.       No current facility-administered medications for this visit.        Allergies: "   Allergies   Allergen Reactions    Penicillins Hives                      Past Surgical History:  Past Surgical History:   Procedure Laterality Date    CIRCUMCISION N/A 3/11/2024    Procedure: CIRCUMCISION, FULGERATION OF WARTS, AND BIOPSY;  Surgeon: Jorge Alberto Francis MD;  Location: [facility];  Service: Urology;  Laterality: N/A;    COLONOSCOPY         Social History:  Social History     Socioeconomic History    Marital status:    Tobacco Use    Smoking status: Former    Smokeless tobacco: Never   Vaping Use    Vaping status: Never Used   Substance and Sexual Activity    Alcohol use: Never    Drug use: Never    Sexual activity: Defer       Family History:  Family History   Problem Relation Age of Onset    No Known Problems Father     No Known Problems Mother        Review of Systems:  Review of Systems   Constitutional:  Positive for fatigue. Negative for chills, fever and unexpected weight change.   HENT:  Negative for congestion and sinus pressure.    Respiratory:  Negative for chest tightness and shortness of breath.    Cardiovascular:  Negative for chest pain.   Gastrointestinal:  Negative for abdominal pain, constipation, diarrhea, nausea and vomiting.   Genitourinary:  Positive for difficulty urinating. Negative for dysuria, flank pain, frequency, hematuria and urgency.   Musculoskeletal:  Negative for back pain and neck pain.   Skin:  Negative for rash.   Neurological:  Negative for dizziness and headaches.   Hematological:  Bruises/bleeds easily.   Psychiatric/Behavioral:  Negative for confusion and suicidal ideas. The patient is not nervous/anxious.        Physical Exam:  Physical Exam  Constitutional:       General: He is not in acute distress.     Appearance: Normal appearance.   HENT:      Head: Normocephalic and atraumatic.      Nose: Nose normal.      Mouth/Throat:      Mouth: Mucous membranes are moist.   Eyes:      Conjunctiva/sclera: Conjunctivae normal.   Cardiovascular:      Rate and  Rhythm: Normal rate and regular rhythm.      Pulses: Normal pulses.      Heart sounds: Normal heart sounds.   Pulmonary:      Effort: Pulmonary effort is normal.      Breath sounds: Normal breath sounds.   Abdominal:      General: Bowel sounds are normal.      Palpations: Abdomen is soft.   Genitourinary:     Comments: Penile cancer.  16 Japanese catheter in place with good urinary output noted  Musculoskeletal:         General: Normal range of motion.      Cervical back: Normal range of motion.   Skin:     General: Skin is warm.   Neurological:      General: No focal deficit present.      Mental Status: He is alert and oriented to person, place, and time.   Psychiatric:         Mood and Affect: Mood normal.         Behavior: Behavior normal.         Thought Content: Thought content normal.         Judgment: Judgment normal.           Recent Image (CT and/or KUB):   CT Abdomen and Pelvis: No results found for this or any previous visit.     CT Stone Protocol: Results for orders placed during the hospital encounter of 07/26/22    CT Abdomen Pelvis Stone Protocol    Narrative  CT Abdomen Pelvis WO    INDICATION:  Hematuria    TECHNIQUE:  CT of the abdomen and pelvis without IV contrast. Coronal and sagittal reconstructions were obtained.  Radiation dose reduction techniques included automated exposure control or exposure modulation based on body size. Count of known CT and cardiac nuc  med studies performed in previous 12 months: 1.    COMPARISON:  6/30/2022    FINDINGS:  Abdomen: Multiple hepatic cysts are again seen, unchanged. Nonspecific low-density splenic lesions are again noted, also unchanged. The pancreas is unremarkable. There is a 2 to 3 cm duodenal diverticulum again noted. Bilateral hydronephrosis is seen and  this has increased since the previous examination. Both ureters are dilated down to the bladder but no ureteral stones are seen. The bladder is distended. There is a Leos catheter. The balloon is  within the penile urethra. No evidence of retroperitoneal  adenopathy. No distended bowel loops.    Pelvis: Normal appendix. No adenopathy or pelvic mass.    Impression  The Leos catheter has slipped distally such that the balloon is in the penile urethra. The bladder is distended consistent with bladder outlet obstruction and there is bilateral hydronephrosis.          Signer Name: Ricky Arredondo MD  Signed: 7/26/2022 7:13 PM  Workstation Name: [id number]  [facility]     KUB: No results found for this or any previous visit.       Labs:  Brief Urine Lab Results       None          Admission on 11/19/2024, Discharged on 11/19/2024   Component Date Value Ref Range Status    Glucose 11/19/2024 111 (H)  65 - 99 mg/dL Final    BUN 11/19/2024 22  8 - 23 mg/dL Final    Creatinine 11/19/2024 1.46 (H)  0.76 - 1.27 mg/dL Final    Sodium 11/19/2024 137  136 - 145 mmol/L Final    Potassium 11/19/2024 4.5  3.5 - 5.2 mmol/L Final    Slight hemolysis detected by analyzer. Result may be falsely elevated.    Chloride 11/19/2024 100  98 - 107 mmol/L Final    CO2 11/19/2024 25.7  22.0 - 29.0 mmol/L Final    Calcium 11/19/2024 9.6  8.2 - 9.6 mg/dL Final    Total Protein 11/19/2024 7.8  6.0 - 8.5 g/dL Final    Albumin 11/19/2024 4.2  3.5 - 5.2 g/dL Final    ALT (SGPT) 11/19/2024 13  1 - 41 U/L Final    AST (SGOT) 11/19/2024 19  1 - 40 U/L Final    Alkaline Phosphatase 11/19/2024 106  39 - 117 U/L Final    Total Bilirubin 11/19/2024 0.5  0.0 - 1.2 mg/dL Final    Globulin 11/19/2024 3.6  gm/dL Final    A/G Ratio 11/19/2024 1.2  g/dL Final    BUN/Creatinine Ratio 11/19/2024 15.1  7.0 - 25.0 Final    Anion Gap 11/19/2024 11.3  5.0 - 15.0 mmol/L Final    eGFR 11/19/2024 44.8 (L)  >60.0 mL/min/1.73 Final    Protime 11/19/2024 30.8 (H)  12.1 - 14.7 Seconds Final    INR 11/19/2024 2.96 (H)  0.90 - 1.10 Final    Lactate 11/19/2024 1.8  0.5 - 2.0 mmol/L Final    TSH 11/19/2024 4.170  0.270 - 4.200 uIU/mL Final     Creatine Kinase 11/19/2024 79  20 - 200 U/L Final    WBC 11/19/2024 8.24  3.40 - 10.80 10*3/mm3 Final    RBC 11/19/2024 5.75  4.14 - 5.80 10*6/mm3 Final    Hemoglobin 11/19/2024 16.5  13.0 - 17.7 g/dL Final    Hematocrit 11/19/2024 52.6 (H)  37.5 - 51.0 % Final    MCV 11/19/2024 91.5  79.0 - 97.0 fL Final    MCH 11/19/2024 28.7  26.6 - 33.0 pg Final    MCHC 11/19/2024 31.4 (L)  31.5 - 35.7 g/dL Final    RDW 11/19/2024 15.1  12.3 - 15.4 % Final    RDW-SD 11/19/2024 50.7  37.0 - 54.0 fl Final    MPV 11/19/2024 9.2  6.0 - 12.0 fL Final    Platelets 11/19/2024 176  140 - 450 10*3/mm3 Final    Neutrophil % 11/19/2024 64.6  42.7 - 76.0 % Final    Lymphocyte % 11/19/2024 19.9  19.6 - 45.3 % Final    Monocyte % 11/19/2024 6.8  5.0 - 12.0 % Final    Eosinophil % 11/19/2024 6.7 (H)  0.3 - 6.2 % Final    Basophil % 11/19/2024 0.5  0.0 - 1.5 % Final    Immature Grans % 11/19/2024 1.5 (H)  0.0 - 0.5 % Final    Neutrophils, Absolute 11/19/2024 5.33  1.70 - 7.00 10*3/mm3 Final    Lymphocytes, Absolute 11/19/2024 1.64  0.70 - 3.10 10*3/mm3 Final    Monocytes, Absolute 11/19/2024 0.56  0.10 - 0.90 10*3/mm3 Final    Eosinophils, Absolute 11/19/2024 0.55 (H)  0.00 - 0.40 10*3/mm3 Final    Basophils, Absolute 11/19/2024 0.04  0.00 - 0.20 10*3/mm3 Final    Immature Grans, Absolute 11/19/2024 0.12 (H)  0.00 - 0.05 10*3/mm3 Final    nRBC 11/19/2024 0.0  0.0 - 0.2 /100 WBC Final    Extra Tube 11/19/2024 Hold for add-ons.   Final    Auto resulted.    Extra Tube 11/19/2024 hold for add-on   Final    Auto resulted    Extra Tube 11/19/2024 Hold for add-ons.   Final    Auto resulted.    Extra Tube 11/19/2024 Hold for add-ons.   Final    Auto resulted        Procedure:  Cath Change, Simple    Date/Time: 12/11/2024 1:50 PM    Performed by: Kushal Dixon PA-C  Authorized by: Kushal Dixon PA-C  Consent: Verbal consent obtained.  Risks and benefits: risks, benefits and alternatives were discussed  Consent given by: patient  Patient  "understanding: patient states understanding of the procedure being performed  Patient consent: the patient's understanding of the procedure matches consent given  Procedure consent: procedure consent matches procedure scheduled  Relevant documents: relevant documents present and verified  Test results: test results available and properly labeled  Site marked: the operative site was marked  Imaging studies: imaging studies available  Patient identity confirmed: verbally with patient  Time out: Immediately prior to procedure a \"time out\" was called to verify the correct patient, procedure, equipment, support staff and site/side marked as required.  Preparation: Patient was prepped and draped in the usual sterile fashion.  Local anesthesia used: no    Anesthesia:  Local anesthesia used: no    Sedation:  Patient sedated: no    Patient tolerance: patient tolerated the procedure well with no immediate complications           Assessment/Plan:   Problem List Items Addressed This Visit       Urinary catheter (Leos) change required - Primary    Penile cancer         Health Maintenance:   Health Maintenance Due   Topic Date Due    ZOSTER VACCINE (1 of 2) Never done    Pneumococcal Vaccine 65+ (1 of 1 - PCV) Never done    RSV Vaccine - Adults (1 - 1-dose 75+ series) Never done    ANNUAL WELLNESS VISIT  Never done    BMI FOLLOWUP  06/30/2023    INFLUENZA VACCINE  Never done    COVID-19 Vaccine (3 - 2024-25 season) 09/01/2024        Smoking Counseling: Former smoker.  Never used smokeless tobacco.    Urine Incontinence: Patient reports that he is not currently experiencing any symptoms of urinary incontinence.    Patient was given instructions and counseling regarding his condition or for health maintenance advice. Please see specific information pulled into the AVS if appropriate.    Patient Education:   Urinary catheter exchange -I did exchange the patient's catheter in office today with 16 Slovak straight catheter and he " tolerated it well with no acute complications.  Recommend to repeat follow-up in 1 month for cath exchange.  Will have the patient undergo repeat cath exchange at his next follow-up with Dr. Francis on 28 January.  Penile cancer/phimosis -patient is interested in undergoing a fulguration in office as well as possible excision of previous skin from circumcision.  Advised him we will have to discontinue blood thinners.  Will get the patient scheduled for a slight fulguration in office with Dr. Francis in January 20.  Vies him to stop blood thinners 1 week prior to intervention.  He verbalized understanding.  Visit Diagnoses:    ICD-10-CM ICD-9-CM   1. Urinary catheter (Leos) change required  Z46.6 V53.6   2. Penile cancer  C60.9 187.4         Meds Ordered During Visit:  No orders of the defined types were placed in this encounter.      Follow Up Appointment:   No follow-ups on file.  January 20      This document has been electronically signed by Kushal Dixon PA-C   December 11, 2024 13:50 EST    Part of this note may be an electronic transcription/translation of spoken language to printed text using the Dragon Dictation System.

## 2025-01-17 ENCOUNTER — TELEPHONE (OUTPATIENT)
Dept: UROLOGY | Facility: CLINIC | Age: OVER 89
End: 2025-01-17
Payer: MEDICARE

## 2025-01-29 ENCOUNTER — OFFICE VISIT (OUTPATIENT)
Dept: UROLOGY | Facility: CLINIC | Age: OVER 89
End: 2025-01-29
Payer: MEDICARE

## 2025-01-29 VITALS
DIASTOLIC BLOOD PRESSURE: 95 MMHG | HEART RATE: 96 BPM | WEIGHT: 188 LBS | BODY MASS INDEX: 26.92 KG/M2 | SYSTOLIC BLOOD PRESSURE: 142 MMHG | HEIGHT: 70 IN

## 2025-01-29 DIAGNOSIS — N39.0 URINARY TRACT INFECTION WITHOUT HEMATURIA, SITE UNSPECIFIED: ICD-10-CM

## 2025-01-29 DIAGNOSIS — Z46.6 URINARY CATHETER (FOLEY) CHANGE REQUIRED: Primary | ICD-10-CM

## 2025-01-29 PROCEDURE — 87086 URINE CULTURE/COLONY COUNT: CPT

## 2025-01-29 PROCEDURE — 87077 CULTURE AEROBIC IDENTIFY: CPT

## 2025-01-29 PROCEDURE — 87186 SC STD MICRODIL/AGAR DIL: CPT

## 2025-01-29 RX ORDER — GENTAMICIN 40 MG/ML
80 INJECTION, SOLUTION INTRAMUSCULAR; INTRAVENOUS ONCE
Status: COMPLETED | OUTPATIENT
Start: 2025-01-29 | End: 2025-01-29

## 2025-01-29 RX ADMIN — GENTAMICIN 80 MG: 40 INJECTION, SOLUTION INTRAMUSCULAR; INTRAVENOUS at 14:07

## 2025-01-29 NOTE — PROGRESS NOTES
"Chief Complaint:    Chief Complaint   Patient presents with    Urinary catheter (Leos) change required       Vital Signs:   /95 (BP Location: Left arm, Patient Position: Sitting)   Pulse 96   Ht 177.8 cm (70\")   Wt 85.3 kg (188 lb)   BMI 26.98 kg/m²   Body mass index is 26.98 kg/m².      HPI:  Stephenie Valdez Jr. is a 89 y.o. male who presents today for follow up    History of Present Illness  Mr. Valdez returns to clinic today for monthly follow-up for cath exchange.  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  He was scheduled undergo a possible fulguration and removal of excess tissue from previous circumcision however is not clear to go off his blood thinners.  He returns today for repeat cath exchange.  He does endorse an odor in his urine and is concerned of infection.  He has had some fatigue and \" felt under the weather.\"  I did exchange his catheter and received yellow urine in return.      Past Medical History:  Past Medical History:   Diagnosis Date    Atrial fibrillation     Dementia     Enlarged prostate     Leos catheter in place     GERD (gastroesophageal reflux disease)     [place] (hard of hearing)     Renal disorder     Sleep apnea        Current Meds:  Current Outpatient Medications   Medication Sig Dispense Refill    clotrimazole-betamethasone (Lotrisone) 1-0.05 % cream Apply 1 application topically to the appropriate area as directed 2 (Two) Times a Day. 45 g 2    HYDROcodone-acetaminophen (NORCO) 5-325 MG per tablet Take 1-2 tablets by mouth Every 6 (Six) Hours As Needed (Pain). 12 tablet 0    imiquimod (ALDARA) 5 % cream Apply to warts 3 times weekly at bedtime.  Leave on for 6 to 10 hours. 12 each 1    warfarin (COUMADIN) 4 MG tablet Take 1 tablet by mouth Daily.       No current facility-administered medications for this visit.        Allergies:   Allergies   Allergen Reactions    Penicillins Hives                      Past " Surgical History:  Past Surgical History:   Procedure Laterality Date    CIRCUMCISION N/A 3/11/2024    Procedure: CIRCUMCISION, FULGERATION OF WARTS, AND BIOPSY;  Surgeon: Jorge Alberto Francis MD;  Location: [facility];  Service: Urology;  Laterality: N/A;    COLONOSCOPY         Social History:  Social History     Socioeconomic History    Marital status:    Tobacco Use    Smoking status: Former    Smokeless tobacco: Never   Vaping Use    Vaping status: Never Used   Substance and Sexual Activity    Alcohol use: Never    Drug use: Never    Sexual activity: Defer       Family History:  Family History   Problem Relation Age of Onset    No Known Problems Father     No Known Problems Mother        Review of Systems:  Review of Systems   Constitutional:  Positive for fatigue. Negative for chills, fever and unexpected weight change.   HENT:  Negative for congestion and sinus pressure.    Respiratory:  Negative for chest tightness and shortness of breath.    Cardiovascular:  Negative for chest pain.   Gastrointestinal:  Negative for abdominal pain, constipation, diarrhea, nausea and vomiting.   Genitourinary:  Positive for difficulty urinating. Negative for dysuria, flank pain, frequency, hematuria and urgency.   Musculoskeletal:  Negative for back pain and neck pain.   Skin:  Negative for rash.   Neurological:  Negative for dizziness and headaches.   Hematological:  Bruises/bleeds easily.   Psychiatric/Behavioral:  Negative for confusion and suicidal ideas. The patient is not nervous/anxious.        Physical Exam:  Physical Exam  Constitutional:       General: He is not in acute distress.     Appearance: Normal appearance.   HENT:      Head: Normocephalic and atraumatic.      Nose: Nose normal.      Mouth/Throat:      Mouth: Mucous membranes are moist.   Eyes:      Conjunctiva/sclera: Conjunctivae normal.   Cardiovascular:      Rate and Rhythm: Normal rate and regular rhythm.      Pulses: Normal pulses.      Heart  sounds: Normal heart sounds.   Pulmonary:      Effort: Pulmonary effort is normal.      Breath sounds: Normal breath sounds.   Abdominal:      General: Bowel sounds are normal.      Palpations: Abdomen is soft.   Genitourinary:     Comments: Penile cancer.  16 Singaporean catheter in place with good urinary output noted  Musculoskeletal:         General: Normal range of motion.      Cervical back: Normal range of motion.   Skin:     General: Skin is warm.   Neurological:      General: No focal deficit present.      Mental Status: He is alert and oriented to person, place, and time.   Psychiatric:         Mood and Affect: Mood normal.         Behavior: Behavior normal.         Thought Content: Thought content normal.         Judgment: Judgment normal.           Recent Image (CT and/or KUB):   CT Abdomen and Pelvis: No results found for this or any previous visit.     CT Stone Protocol: Results for orders placed during the hospital encounter of 07/26/22    CT Abdomen Pelvis Stone Protocol    Narrative  CT Abdomen Pelvis WO    INDICATION:  Hematuria    TECHNIQUE:  CT of the abdomen and pelvis without IV contrast. Coronal and sagittal reconstructions were obtained.  Radiation dose reduction techniques included automated exposure control or exposure modulation based on body size. Count of known CT and cardiac nuc  med studies performed in previous 12 months: 1.    COMPARISON:  6/30/2022    FINDINGS:  Abdomen: Multiple hepatic cysts are again seen, unchanged. Nonspecific low-density splenic lesions are again noted, also unchanged. The pancreas is unremarkable. There is a 2 to 3 cm duodenal diverticulum again noted. Bilateral hydronephrosis is seen and  this has increased since the previous examination. Both ureters are dilated down to the bladder but no ureteral stones are seen. The bladder is distended. There is a Leos catheter. The balloon is within the penile urethra. No evidence of retroperitoneal  adenopathy. No  distended bowel loops.    Pelvis: Normal appendix. No adenopathy or pelvic mass.    Impression  The Leos catheter has slipped distally such that the balloon is in the penile urethra. The bladder is distended consistent with bladder outlet obstruction and there is bilateral hydronephrosis.          Signer Name: Ricky Arredondo MD  Signed: 7/26/2022 7:13 PM  Workstation Name: [id number]  [facility]     KUB: No results found for this or any previous visit.       Labs:  Brief Urine Lab Results       None          Admission on 11/19/2024, Discharged on 11/19/2024   Component Date Value Ref Range Status    Glucose 11/19/2024 111 (H)  65 - 99 mg/dL Final    BUN 11/19/2024 22  8 - 23 mg/dL Final    Creatinine 11/19/2024 1.46 (H)  0.76 - 1.27 mg/dL Final    Sodium 11/19/2024 137  136 - 145 mmol/L Final    Potassium 11/19/2024 4.5  3.5 - 5.2 mmol/L Final    Slight hemolysis detected by analyzer. Result may be falsely elevated.    Chloride 11/19/2024 100  98 - 107 mmol/L Final    CO2 11/19/2024 25.7  22.0 - 29.0 mmol/L Final    Calcium 11/19/2024 9.6  8.2 - 9.6 mg/dL Final    Total Protein 11/19/2024 7.8  6.0 - 8.5 g/dL Final    Albumin 11/19/2024 4.2  3.5 - 5.2 g/dL Final    ALT (SGPT) 11/19/2024 13  1 - 41 U/L Final    AST (SGOT) 11/19/2024 19  1 - 40 U/L Final    Alkaline Phosphatase 11/19/2024 106  39 - 117 U/L Final    Total Bilirubin 11/19/2024 0.5  0.0 - 1.2 mg/dL Final    Globulin 11/19/2024 3.6  gm/dL Final    A/G Ratio 11/19/2024 1.2  g/dL Final    BUN/Creatinine Ratio 11/19/2024 15.1  7.0 - 25.0 Final    Anion Gap 11/19/2024 11.3  5.0 - 15.0 mmol/L Final    eGFR 11/19/2024 44.8 (L)  >60.0 mL/min/1.73 Final    Protime 11/19/2024 30.8 (H)  12.1 - 14.7 Seconds Final    INR 11/19/2024 2.96 (H)  0.90 - 1.10 Final    Lactate 11/19/2024 1.8  0.5 - 2.0 mmol/L Final    TSH 11/19/2024 4.170  0.270 - 4.200 uIU/mL Final    Creatine Kinase 11/19/2024 79  20 - 200 U/L Final    WBC 11/19/2024 8.24  3.40 -  10.80 10*3/mm3 Final    RBC 11/19/2024 5.75  4.14 - 5.80 10*6/mm3 Final    Hemoglobin 11/19/2024 16.5  13.0 - 17.7 g/dL Final    Hematocrit 11/19/2024 52.6 (H)  37.5 - 51.0 % Final    MCV 11/19/2024 91.5  79.0 - 97.0 fL Final    MCH 11/19/2024 28.7  26.6 - 33.0 pg Final    MCHC 11/19/2024 31.4 (L)  31.5 - 35.7 g/dL Final    RDW 11/19/2024 15.1  12.3 - 15.4 % Final    RDW-SD 11/19/2024 50.7  37.0 - 54.0 fl Final    MPV 11/19/2024 9.2  6.0 - 12.0 fL Final    Platelets 11/19/2024 176  140 - 450 10*3/mm3 Final    Neutrophil % 11/19/2024 64.6  42.7 - 76.0 % Final    Lymphocyte % 11/19/2024 19.9  19.6 - 45.3 % Final    Monocyte % 11/19/2024 6.8  5.0 - 12.0 % Final    Eosinophil % 11/19/2024 6.7 (H)  0.3 - 6.2 % Final    Basophil % 11/19/2024 0.5  0.0 - 1.5 % Final    Immature Grans % 11/19/2024 1.5 (H)  0.0 - 0.5 % Final    Neutrophils, Absolute 11/19/2024 5.33  1.70 - 7.00 10*3/mm3 Final    Lymphocytes, Absolute 11/19/2024 1.64  0.70 - 3.10 10*3/mm3 Final    Monocytes, Absolute 11/19/2024 0.56  0.10 - 0.90 10*3/mm3 Final    Eosinophils, Absolute 11/19/2024 0.55 (H)  0.00 - 0.40 10*3/mm3 Final    Basophils, Absolute 11/19/2024 0.04  0.00 - 0.20 10*3/mm3 Final    Immature Grans, Absolute 11/19/2024 0.12 (H)  0.00 - 0.05 10*3/mm3 Final    nRBC 11/19/2024 0.0  0.0 - 0.2 /100 WBC Final    Extra Tube 11/19/2024 Hold for add-ons.   Final    Auto resulted.    Extra Tube 11/19/2024 hold for add-on   Final    Auto resulted    Extra Tube 11/19/2024 Hold for add-ons.   Final    Auto resulted.    Extra Tube 11/19/2024 Hold for add-ons.   Final    Auto resulted        Procedure:  Cath Change, Simple    Date/Time: 1/29/2025 2:03 PM    Performed by: Kushal Dioxn PA-C  Authorized by: Kushal Dixon PA-C  Consent: Verbal consent obtained.  Risks and benefits: risks, benefits and alternatives were discussed  Consent given by: patient  Patient understanding: patient states understanding of the procedure being performed  Patient  "consent: the patient's understanding of the procedure matches consent given  Procedure consent: procedure consent matches procedure scheduled  Relevant documents: relevant documents present and verified  Test results: test results available and properly labeled  Site marked: the operative site was marked  Imaging studies: imaging studies available  Patient identity confirmed: verbally with patient  Time out: Immediately prior to procedure a \"time out\" was called to verify the correct patient, procedure, equipment, support staff and site/side marked as required.  Preparation: Patient was prepped and draped in the usual sterile fashion.  Local anesthesia used: no    Anesthesia:  Local anesthesia used: no    Sedation:  Patient sedated: no    Patient tolerance: patient tolerated the procedure well with no immediate complications           Assessment/Plan:   Problem List Items Addressed This Visit       Urinary catheter (Leos) change required - Primary    Relevant Orders    Cath Change, Simple     Other Visit Diagnoses       Urinary tract infection without hematuria, site unspecified        Relevant Medications    gentamicin (GARAMYCIN) injection 80 mg (Completed)    Other Relevant Orders    Urine Culture - Urine, Urine, Clean Catch                Health Maintenance:   Health Maintenance Due   Topic Date Due    ZOSTER VACCINE (1 of 2) Never done    Pneumococcal Vaccine 65+ (1 of 1 - PCV) Never done    RSV Vaccine - Adults (1 - 1-dose 75+ series) Never done    ANNUAL WELLNESS VISIT  Never done    BMI FOLLOWUP  06/30/2023    INFLUENZA VACCINE  Never done    COVID-19 Vaccine (3 - 2024-25 season) 09/01/2024        Smoking Counseling: Former smoker.  Never used smokeless tobacco.    Urine Incontinence: Patient reports that he is not currently experiencing any symptoms of urinary incontinence.    Patient was given instructions and counseling regarding his condition or for health maintenance advice. Please see specific " information pulled into the AVS if appropriate.    Patient Education:   Urinary catheter exchange -I did exchange the patient's 16 Mongolian catheter out in office today and received yellow urine in return.  No complications were noted.  Will have him keep in monthly follow-up for repeat cath exchanges.  Urinary tract infection -patient is concerned of an acute urinary tract infection now given injection of gentamicin 80 mg in office today for UTI prophylaxis.  I will send his urine off for culture and call once results are available.  Vies to call back with any questions or concerns otherwise we will follow-up in a month.  He verbalized understanding.      ICD-10-CM ICD-9-CM   1. Urinary catheter (Leos) change required  Z46.6 V53.6   2. Urinary tract infection without hematuria, site unspecified  N39.0 599.0         Meds Ordered During Visit:  New Medications Ordered This Visit   Medications    gentamicin (GARAMYCIN) injection 80 mg       Follow Up Appointment: 1 month  No follow-ups on file.        This document has been electronically signed by Kushal Dixon PA-C   January 29, 2025 14:11 EST    Part of this note may be an electronic transcription/translation of spoken language to printed text using the Dragon Dictation System.

## 2025-02-01 LAB
BACTERIA SPEC AEROBE CULT: ABNORMAL
BACTERIA SPEC AEROBE CULT: ABNORMAL

## 2025-02-02 DIAGNOSIS — N39.0 URINARY TRACT INFECTION WITHOUT HEMATURIA, SITE UNSPECIFIED: Primary | ICD-10-CM

## 2025-02-02 RX ORDER — CEFDINIR 300 MG/1
300 CAPSULE ORAL EVERY 12 HOURS
Qty: 20 CAPSULE | Refills: 0 | Status: SHIPPED | OUTPATIENT
Start: 2025-02-02

## 2025-02-03 ENCOUNTER — TELEPHONE (OUTPATIENT)
Dept: UROLOGY | Facility: CLINIC | Age: OVER 89
End: 2025-02-03
Payer: MEDICARE

## 2025-02-03 NOTE — TELEPHONE ENCOUNTER
----- Message from Kushal Dixon sent at 2/2/2025  9:00 PM EST -----  Please let patient know urine was positive for bacteria. I went ahead and sent cefdinir 300mg every 12 hours for 10 days. He has taken this medication before but if he has any issue with it please gives us a call and we can switch antibiotics. Thank you.  ----- Message -----  From: Lab, Background User  Sent: 1/31/2025   1:18 PM EST  To: Kushal Dixon PA-C

## 2025-02-03 NOTE — TELEPHONE ENCOUNTER
----- Message from Kushal Dixon sent at 2/2/2025  9:00 PM EST -----  Please let patient know urine was positive for bacteria. I went ahead and sent cefdinir 300mg every 12 hours for 10 days. He has taken this medication before but if he has any issue with it please gives us a call and we can switch antibiotics. Thank you.  ----- Message -----  From: Lab, Background User  Sent: 1/31/2025   1:18 PM EST  To: Kuhsal Dixon PA-C

## 2025-02-03 NOTE — TELEPHONE ENCOUNTER
I called patient to go over urine culture results he did not answer left voicemail for him to please call back

## 2025-02-03 NOTE — TELEPHONE ENCOUNTER
Patients wife called me back I went over urine results and let her know medication has been called in she was going to get it for him

## 2025-02-06 ENCOUNTER — TELEPHONE (OUTPATIENT)
Dept: UROLOGY | Facility: CLINIC | Age: OVER 89
End: 2025-02-06
Payer: MEDICARE

## 2025-02-06 NOTE — TELEPHONE ENCOUNTER
Patients home health called to get a diagnosis since the pr was placed on antibiotics. I told her he had a UTI.

## 2025-03-06 ENCOUNTER — OFFICE VISIT (OUTPATIENT)
Dept: UROLOGY | Facility: CLINIC | Age: OVER 89
End: 2025-03-06
Payer: MEDICARE

## 2025-03-06 VITALS
WEIGHT: 194 LBS | HEART RATE: 112 BPM | DIASTOLIC BLOOD PRESSURE: 91 MMHG | SYSTOLIC BLOOD PRESSURE: 148 MMHG | BODY MASS INDEX: 27.77 KG/M2 | HEIGHT: 70 IN

## 2025-03-06 DIAGNOSIS — Z46.6 URINARY CATHETER (FOLEY) CHANGE REQUIRED: Primary | ICD-10-CM

## 2025-03-06 NOTE — PROGRESS NOTES
"Chief Complaint:    Chief Complaint   Patient presents with    Urinary catheter (Leos) change required       Vital Signs:   /91 (BP Location: Right arm, Patient Position: Sitting, Cuff Size: Adult)   Pulse 112   Ht 177.8 cm (70\")   Wt 88 kg (194 lb)   BMI 27.84 kg/m²   Body mass index is 27.84 kg/m².      HPI:  Stephenie Valdez Jr. is a 89 y.o. male who presents today for follow up    History of Present Illness  Mr. Valdez presents to the clinic for follow-up for Leos catheter exchange and balanitis/penile cancer.  He is status post circumcision by Dr. Francis and healed appropriately.  He has not had any issues since last office visit.  I did exchange his 16 French catheter in office today and [place] was noted in return.  He has no other complaints.      Past Medical History:  Past Medical History:   Diagnosis Date    Atrial fibrillation     Dementia     Enlarged prostate     Leos catheter in place     GERD (gastroesophageal reflux disease)     [place] (hard of hearing)     Renal disorder     Sleep apnea        Current Meds:  Current Outpatient Medications   Medication Sig Dispense Refill    clotrimazole-betamethasone (Lotrisone) 1-0.05 % cream Apply 1 application topically to the appropriate area as directed 2 (Two) Times a Day. 45 g 2    HYDROcodone-acetaminophen (NORCO) 5-325 MG per tablet Take 1-2 tablets by mouth Every 6 (Six) Hours As Needed (Pain). 12 tablet 0    imiquimod (ALDARA) 5 % cream Apply to warts 3 times weekly at bedtime.  Leave on for 6 to 10 hours. 12 each 1    warfarin (COUMADIN) 4 MG tablet Take 1 tablet by mouth Daily.      cefdinir (OMNICEF) 300 MG capsule Take 1 capsule by mouth Every 12 (Twelve) Hours. (Patient not taking: Reported on 3/6/2025) 20 capsule 0     No current facility-administered medications for this visit.        Allergies:   Allergies   Allergen Reactions    Penicillins Hives                      Past Surgical History:  Past Surgical History:   Procedure Laterality Date "    CIRCUMCISION N/A 3/11/2024    Procedure: CIRCUMCISION, FULGERATION OF WARTS, AND BIOPSY;  Surgeon: Jorge Alberto Francis MD;  Location: [facility];  Service: Urology;  Laterality: N/A;    COLONOSCOPY         Social History:  Social History     Socioeconomic History    Marital status:    Tobacco Use    Smoking status: Former    Smokeless tobacco: Never   Vaping Use    Vaping status: Never Used   Substance and Sexual Activity    Alcohol use: Never    Drug use: Never    Sexual activity: Defer       Family History:  Family History   Problem Relation Age of Onset    No Known Problems Father     No Known Problems Mother        Review of Systems:  Review of Systems   Constitutional:  Positive for fatigue. Negative for chills, fever and unexpected weight change.   HENT:  Negative for congestion and sinus pressure.    Respiratory:  Negative for chest tightness and shortness of breath.    Cardiovascular:  Negative for chest pain.   Gastrointestinal:  Negative for abdominal pain, constipation, diarrhea, nausea and vomiting.   Genitourinary:  Positive for difficulty urinating. Negative for dysuria, flank pain, frequency, hematuria and urgency.   Musculoskeletal:  Negative for back pain and neck pain.   Skin:  Negative for rash.   Neurological:  Negative for dizziness and headaches.   Hematological:  Bruises/bleeds easily.   Psychiatric/Behavioral:  Negative for confusion and suicidal ideas. The patient is not nervous/anxious.        Physical Exam:  Physical Exam  Constitutional:       General: He is not in acute distress.     Comments: Frail elderly man   HENT:      Head: Normocephalic and atraumatic.      Nose: Nose normal.      Mouth/Throat:      Mouth: Mucous membranes are moist.   Eyes:      Conjunctiva/sclera: Conjunctivae normal.   Cardiovascular:      Rate and Rhythm: Normal rate.      Pulses: Normal pulses.   Pulmonary:      Effort: Pulmonary effort is normal.   Abdominal:      Palpations: Abdomen is soft.    Genitourinary:     Comments: 16 Vietnamese catheter in place.  Penile cancer.  Musculoskeletal:      Cervical back: Normal range of motion.      Comments: Uses a cane for ambulation   Skin:     General: Skin is warm.   Neurological:      General: No focal deficit present.      Mental Status: He is alert and oriented to person, place, and time.      Motor: Weakness present.   Psychiatric:         Mood and Affect: Mood normal.         Behavior: Behavior normal.         Thought Content: Thought content normal.         Judgment: Judgment normal.           Recent Image (CT and/or KUB):   CT Abdomen and Pelvis: No results found for this or any previous visit.     CT Stone Protocol: Results for orders placed during the hospital encounter of 07/26/22    CT Abdomen Pelvis Stone Protocol    Narrative  CT Abdomen Pelvis WO    INDICATION:  Hematuria    TECHNIQUE:  CT of the abdomen and pelvis without IV contrast. Coronal and sagittal reconstructions were obtained.  Radiation dose reduction techniques included automated exposure control or exposure modulation based on body size. Count of known CT and cardiac nuc  med studies performed in previous 12 months: 1.    COMPARISON:  6/30/2022    FINDINGS:  Abdomen: Multiple hepatic cysts are again seen, unchanged. Nonspecific low-density splenic lesions are again noted, also unchanged. The pancreas is unremarkable. There is a 2 to 3 cm duodenal diverticulum again noted. Bilateral hydronephrosis is seen and  this has increased since the previous examination. Both ureters are dilated down to the bladder but no ureteral stones are seen. The bladder is distended. There is a Leos catheter. The balloon is within the penile urethra. No evidence of retroperitoneal  adenopathy. No distended bowel loops.    Pelvis: Normal appendix. No adenopathy or pelvic mass.    Impression  The Leos catheter has slipped distally such that the balloon is in the penile urethra. The bladder is distended  "consistent with bladder outlet obstruction and there is bilateral hydronephrosis.          Signer Name: Ricky Arredondo MD  Signed: 7/26/2022 7:13 PM  Workstation Name: [id number]  [facility]     KUB: No results found for this or any previous visit.       Labs:  Brief Urine Lab Results       None          Office Visit on 01/29/2025   Component Date Value Ref Range Status    Urine Culture 01/29/2025 >100,000 CFU/mL Klebsiella oxytoca (A)   Final    Urine Culture 01/29/2025 >100,000 CFU/mL Escherichia coli (A)   Final        Procedure:  Cath Change, Simple    Date/Time: 3/6/2025 6:53 PM    Performed by: Kushal Dixon PA-C  Authorized by: Kushal Dixon PA-C  Consent: Verbal consent obtained.  Consent given by: patient  Patient understanding: patient states understanding of the procedure being performed  Patient consent: the patient's understanding of the procedure matches consent given  Procedure consent: procedure consent matches procedure scheduled  Relevant documents: relevant documents present and verified  Test results: test results available and properly labeled  Site marked: the operative site was marked  Patient identity confirmed: verbally with patient  Time out: Immediately prior to procedure a \"time out\" was called to verify the correct patient, procedure, equipment, support staff and site/side marked as required.  Preparation: Patient was prepped and draped in the usual sterile fashion.  Local anesthesia used: no    Anesthesia:  Local anesthesia used: no    Sedation:  Patient sedated: no    Patient tolerance: patient tolerated the procedure well with no immediate complications             Assessment/Plan:   Problem List Items Addressed This Visit       Urinary catheter (Leos) change required - Primary       Health Maintenance:   Health Maintenance Due   Topic Date Due    Pneumococcal Vaccine 50+ (1 of 1 - PCV) Never done    ZOSTER VACCINE (1 of 2) Never done    RSV Vaccine - " Adults (1 - 1-dose 75+ series) Never done    ANNUAL WELLNESS VISIT  Never done    BMI FOLLOWUP  06/30/2023    INFLUENZA VACCINE  Never done    COVID-19 Vaccine (3 - 2024-25 season) 09/01/2024        Smoking Counseling: Former smoker.  Never used smokeless tobacco.  Counseling given.    Patient was given instructions and counseling regarding his condition or for health maintenance advice. Please see specific information pulled into the AVS if appropriate.    Patient Education:   Leos catheter exchange -I did exchange the patient's indwelling Leos catheter today and he tolerated this well with no acute complications.  Will place him back in 1 month for repeat Leos cath exchange    Visit Diagnoses:    ICD-10-CM ICD-9-CM   1. Urinary catheter (Leos) change required  Z46.6 V53.6       Meds Ordered During Visit:  No orders of the defined types were placed in this encounter.      Follow Up Appointment: 1 month  No follow-ups on file.      This document has been electronically signed by Kushal Dixon PA-C   March 6, 2025 18:54 EST    Part of this note may be an electronic transcription/translation of spoken language to printed text using the Dragon Dictation System.

## 2025-04-09 ENCOUNTER — OFFICE VISIT (OUTPATIENT)
Dept: UROLOGY | Facility: CLINIC | Age: OVER 89
End: 2025-04-09
Payer: MEDICARE

## 2025-04-09 DIAGNOSIS — Z46.6 URINARY CATHETER (FOLEY) CHANGE REQUIRED: Primary | ICD-10-CM

## 2025-04-09 NOTE — PROGRESS NOTES
Chief Complaint:    Chief Complaint   Patient presents with    Urinary catheter (Leos) change required     Cath change        Vital Signs:   There were no vitals taken for this visit.  There is no height or weight on file to calculate BMI.      HPI:  Stephenie Valdez Jr. is a 89 y.o. male who presents today for follow up    History of Present Illness  Mr. Valdez returns to the clinic today for follow-up for Leos catheter exchange.  He was last seen 1 month ago and underwent repeat cath exchange with no acute complications.  Did recently have a fall at home and has been diagnosed with pneumonia.  He is currently on antibiotics.  I did change out the patient's 16 French coudé in office today and received yellow urine return.      Past Medical History:  Past Medical History:   Diagnosis Date    Atrial fibrillation     Dementia     Enlarged prostate     Leos catheter in place     GERD (gastroesophageal reflux disease)     [place] (hard of hearing)     Renal disorder     Sleep apnea        Current Meds:  Current Outpatient Medications   Medication Sig Dispense Refill    cefdinir (OMNICEF) 300 MG capsule Take 1 capsule by mouth Every 12 (Twelve) Hours. (Patient not taking: Reported on 3/6/2025) 20 capsule 0    clotrimazole-betamethasone (Lotrisone) 1-0.05 % cream Apply 1 application topically to the appropriate area as directed 2 (Two) Times a Day. 45 g 2    HYDROcodone-acetaminophen (NORCO) 5-325 MG per tablet Take 1-2 tablets by mouth Every 6 (Six) Hours As Needed (Pain). 12 tablet 0    imiquimod (ALDARA) 5 % cream Apply to warts 3 times weekly at bedtime.  Leave on for 6 to 10 hours. 12 each 1    warfarin (COUMADIN) 4 MG tablet Take 1 tablet by mouth Daily.       No current facility-administered medications for this visit.        Allergies:   Allergies   Allergen Reactions    Penicillins Hives                      Past Surgical History:  Past Surgical History:   Procedure Laterality Date    CIRCUMCISION N/A 3/11/2024     Procedure: CIRCUMCISION, FULGERATION OF WARTS, AND BIOPSY;  Surgeon: Jorge Alberto Francis MD;  Location: [facility] OR;  Service: Urology;  Laterality: N/A;    COLONOSCOPY         Social History:  Social History     Socioeconomic History    Marital status:    Tobacco Use    Smoking status: Former    Smokeless tobacco: Never   Vaping Use    Vaping status: Never Used   Substance and Sexual Activity    Alcohol use: Never    Drug use: Never    Sexual activity: Defer       Family History:  Family History   Problem Relation Age of Onset    No Known Problems Father     No Known Problems Mother        Review of Systems:  Review of Systems   Constitutional:  Positive for fatigue. Negative for chills and fever.   HENT:  Negative for congestion and sinus pressure.    Respiratory:  Negative for chest tightness and shortness of breath.    Cardiovascular:  Negative for chest pain.   Gastrointestinal:  Negative for abdominal pain, constipation, diarrhea, nausea and vomiting.   Genitourinary:  Positive for difficulty urinating. Negative for flank pain, frequency, hematuria and urgency.   Musculoskeletal:  Negative for back pain and neck pain.   Skin:  Negative for rash.   Neurological:  Negative for dizziness and headaches.   Hematological:  Bruises/bleeds easily.   Psychiatric/Behavioral:  The patient is not nervous/anxious.        Physical Exam:  Physical Exam  Constitutional:       General: He is not in acute distress.     Appearance: Normal appearance.   HENT:      Head: Normocephalic and atraumatic.      Nose: Nose normal.      Mouth/Throat:      Mouth: Mucous membranes are moist.   Eyes:      Conjunctiva/sclera: Conjunctivae normal.   Cardiovascular:      Rate and Rhythm: Normal rate.      Pulses: Normal pulses.   Pulmonary:      Effort: Pulmonary effort is normal.   Abdominal:      Palpations: Abdomen is soft.   Genitourinary:     Comments: Penile cancer.  16 Pashto indwelling Leos catheter in place with yellow urine  noted in collection tubing and bag.  Musculoskeletal:         General: Normal range of motion.      Cervical back: Normal range of motion.   Skin:     General: Skin is warm.   Neurological:      General: No focal deficit present.      Mental Status: He is alert and oriented to person, place, and time.   Psychiatric:         Mood and Affect: Mood normal.         Behavior: Behavior normal.         Thought Content: Thought content normal.         Judgment: Judgment normal.         Recent Image (CT and/or KUB):   CT Abdomen and Pelvis: No results found for this or any previous visit.     CT Stone Protocol: Results for orders placed during the hospital encounter of 07/26/22    CT Abdomen Pelvis Stone Protocol    Narrative  CT Abdomen Pelvis WO    INDICATION:  Hematuria    TECHNIQUE:  CT of the abdomen and pelvis without IV contrast. Coronal and sagittal reconstructions were obtained.  Radiation dose reduction techniques included automated exposure control or exposure modulation based on body size. Count of known CT and cardiac nuc  med studies performed in previous 12 months: 1.    COMPARISON:  6/30/2022    FINDINGS:  Abdomen: Multiple hepatic cysts are again seen, unchanged. Nonspecific low-density splenic lesions are again noted, also unchanged. The pancreas is unremarkable. There is a 2 to 3 cm duodenal diverticulum again noted. Bilateral hydronephrosis is seen and  this has increased since the previous examination. Both ureters are dilated down to the bladder but no ureteral stones are seen. The bladder is distended. There is a Leos catheter. The balloon is within the penile urethra. No evidence of retroperitoneal  adenopathy. No distended bowel loops.    Pelvis: Normal appendix. No adenopathy or pelvic mass.    Impression  The Leos catheter has slipped distally such that the balloon is in the penile urethra. The bladder is distended consistent with bladder outlet obstruction and there is bilateral  "hydronephrosis.          Signer Name: Ricky Arredondo MD  Signed: 7/26/2022 7:13 PM  Workstation Name: [id number]  [facility]     KUB: No results found for this or any previous visit.       Labs:  Brief Urine Lab Results       None          Office Visit on 01/29/2025   Component Date Value Ref Range Status    Urine Culture 01/29/2025 >100,000 CFU/mL Klebsiella oxytoca (A)   Final    Urine Culture 01/29/2025 >100,000 CFU/mL Escherichia coli (A)   Final        Procedure:  Cath Change, Simple    Date/Time: 4/9/2025 12:21 PM    Performed by: Kushal Dixon PA-C  Authorized by: Kushal Dixon PA-C  Consent: Verbal consent obtained  Risks and benefits: risks, benefits and alternatives were discussed  Consent given by: patient  Patient understanding: patient states understanding of the procedure being performed  Patient consent: the patient's understanding of the procedure matches consent given  Procedure consent: procedure consent matches procedure scheduled  Relevant documents: relevant documents present and verified  Test results: test results available and properly labeled  Site marked: the operative site was marked  Imaging studies: imaging studies available  Patient identity confirmed: verbally with patient  Time out: Immediately prior to procedure a \"time out\" was called to verify the correct patient, procedure, equipment, support staff and site/side marked as required.  Preparation: Patient was prepped and draped in the usual sterile fashion.  Local anesthesia used: no    Anesthesia:  Local anesthesia used: no    Sedation:  Patient sedated: no             Assessment/Plan:   Problem List Items Addressed This Visit       Urinary catheter (Leos) change required - Primary       Health Maintenance:   Health Maintenance Due   Topic Date Due    Pneumococcal Vaccine 50+ (1 of 1 - PCV) Never done    ZOSTER VACCINE (1 of 2) Never done    RSV Vaccine - Adults (1 - 1-dose 75+ series) Never done "    ANNUAL WELLNESS VISIT  Never done    COVID-19 Vaccine (3 - 2024-25 season) 09/01/2024        Smoking Counseling: Former smoker.  Never used smokeless tobacco.    Patient was given instructions and counseling regarding his condition or for health maintenance advice. Please see specific information pulled into the AVS if appropriate.    Patient Education:   Leos catheter -I did exchange the patient's 16 Mongolian coudé indwelling Leos catheter in office today and received yellow urine in return.  Otherwise we will place him back in 1 month for repeat cath exchange    Visit Diagnoses:    ICD-10-CM ICD-9-CM   1. Urinary catheter (Leos) change required  Z46.6 V53.6       Meds Ordered During Visit:  No orders of the defined types were placed in this encounter.      Follow Up Appointment: 1 month  No follow-ups on file.      This document has been electronically signed by Kushal Dixon PA-C   April 9, 2025 12:23 EDT    Part of this note may be an electronic transcription/translation of spoken language to printed text using the Dragon Dictation System.

## 2025-05-07 ENCOUNTER — OFFICE VISIT (OUTPATIENT)
Dept: UROLOGY | Facility: CLINIC | Age: OVER 89
End: 2025-05-07
Payer: MEDICARE

## 2025-05-07 VITALS
WEIGHT: 187.2 LBS | HEIGHT: 70 IN | BODY MASS INDEX: 26.8 KG/M2 | SYSTOLIC BLOOD PRESSURE: 129 MMHG | HEART RATE: 89 BPM | DIASTOLIC BLOOD PRESSURE: 85 MMHG

## 2025-05-07 DIAGNOSIS — Z46.6 URINARY CATHETER (FOLEY) CHANGE REQUIRED: Primary | ICD-10-CM

## 2025-05-07 NOTE — PROGRESS NOTES
"Chief Complaint:    Chief Complaint   Patient presents with    Urinary catheter (Leos) change required       Vital Signs:   /85 (BP Location: Right arm, Patient Position: Sitting, Cuff Size: Adult)   Pulse 89   Ht 177.8 cm (70\")   Wt 84.9 kg (187 lb 3.2 oz)   BMI 26.86 kg/m²   Body mass index is 26.86 kg/m².      HPI:  Stephenie Valdez Jr. is a 89 y.o. male who presents today for follow up    History of Present Illness  Mr. Valdez returns to the clinic for 1 month follow-up Leos catheter exchanges.  He has not had any issues since last cath exchange.  Patient's wife is concerned his dementia is worsening and becomes intermittently confused more often.  Patient seems pleasant in office today.  I did exchange his indwelling 16 French Leos catheter and received yellow urine in return.      Past Medical History:  Past Medical History:   Diagnosis Date    Atrial fibrillation     Dementia     Enlarged prostate     Leos catheter in place     GERD (gastroesophageal reflux disease)     [place] (hard of hearing)     Renal disorder     Sleep apnea        Current Meds:  Current Outpatient Medications   Medication Sig Dispense Refill    imiquimod (ALDARA) 5 % cream Apply to warts 3 times weekly at bedtime.  Leave on for 6 to 10 hours. 12 each 1    warfarin (COUMADIN) 4 MG tablet Take 1 tablet by mouth Daily.      cefdinir (OMNICEF) 300 MG capsule Take 1 capsule by mouth Every 12 (Twelve) Hours. (Patient not taking: Reported on 5/7/2025) 20 capsule 0    clotrimazole-betamethasone (Lotrisone) 1-0.05 % cream Apply 1 application topically to the appropriate area as directed 2 (Two) Times a Day. (Patient not taking: Reported on 5/7/2025) 45 g 2    HYDROcodone-acetaminophen (NORCO) 5-325 MG per tablet Take 1-2 tablets by mouth Every 6 (Six) Hours As Needed (Pain). (Patient not taking: Reported on 5/7/2025) 12 tablet 0     No current facility-administered medications for this visit.        Allergies:   Allergies   Allergen " Reactions    Penicillins Hives                      Past Surgical History:  Past Surgical History:   Procedure Laterality Date    CIRCUMCISION N/A 3/11/2024    Procedure: CIRCUMCISION, FULGERATION OF WARTS, AND BIOPSY;  Surgeon: Jorge Alberto Francis MD;  Location: [facility];  Service: Urology;  Laterality: N/A;    COLONOSCOPY         Social History:  Social History     Socioeconomic History    Marital status:    Tobacco Use    Smoking status: Former    Smokeless tobacco: Never   Vaping Use    Vaping status: Never Used   Substance and Sexual Activity    Alcohol use: Never    Drug use: Never    Sexual activity: Defer       Family History:  Family History   Problem Relation Age of Onset    No Known Problems Father     No Known Problems Mother        Review of Systems:  Review of Systems   Constitutional:  Positive for fatigue. Negative for chills and fever.   HENT:  Negative for congestion and sinus pressure.    Respiratory:  Negative for chest tightness and shortness of breath.    Cardiovascular:  Negative for chest pain.   Gastrointestinal:  Negative for abdominal pain, constipation, diarrhea, nausea and vomiting.   Genitourinary:  Positive for difficulty urinating. Negative for flank pain, frequency, hematuria and urgency.   Musculoskeletal:  Negative for back pain and neck pain.   Skin:  Negative for rash.   Neurological:  Negative for dizziness and headaches.   Hematological:  Bruises/bleeds easily.   Psychiatric/Behavioral:  The patient is not nervous/anxious.        Physical Exam:  Physical Exam  Constitutional:       General: He is not in acute distress.     Appearance: Normal appearance.   HENT:      Head: Normocephalic and atraumatic.      Nose: Nose normal.      Mouth/Throat:      Mouth: Mucous membranes are moist.   Eyes:      Conjunctiva/sclera: Conjunctivae normal.   Cardiovascular:      Rate and Rhythm: Normal rate.      Pulses: Normal pulses.   Pulmonary:      Effort: Pulmonary effort is  normal.   Abdominal:      Palpations: Abdomen is soft.   Genitourinary:     Comments: 16 Bangladeshi indwelling Leos catheter in place with yellow urine noted in collection bag.  Penile lesions consistent with carcinomas and warts.  Musculoskeletal:         General: Normal range of motion.      Cervical back: Normal range of motion.   Skin:     General: Skin is warm.   Neurological:      General: No focal deficit present.      Mental Status: He is alert and oriented to person, place, and time.   Psychiatric:         Mood and Affect: Mood normal.         Behavior: Behavior normal.         Thought Content: Thought content normal.         Judgment: Judgment normal.           Recent Image (CT and/or KUB):   CT Abdomen and Pelvis: No results found for this or any previous visit.     CT Stone Protocol: Results for orders placed during the hospital encounter of 07/26/22    CT Abdomen Pelvis Stone Protocol    Narrative  CT Abdomen Pelvis WO    INDICATION:  Hematuria    TECHNIQUE:  CT of the abdomen and pelvis without IV contrast. Coronal and sagittal reconstructions were obtained.  Radiation dose reduction techniques included automated exposure control or exposure modulation based on body size. Count of known CT and cardiac nuc  med studies performed in previous 12 months: 1.    COMPARISON:  6/30/2022    FINDINGS:  Abdomen: Multiple hepatic cysts are again seen, unchanged. Nonspecific low-density splenic lesions are again noted, also unchanged. The pancreas is unremarkable. There is a 2 to 3 cm duodenal diverticulum again noted. Bilateral hydronephrosis is seen and  this has increased since the previous examination. Both ureters are dilated down to the bladder but no ureteral stones are seen. The bladder is distended. There is a Leos catheter. The balloon is within the penile urethra. No evidence of retroperitoneal  adenopathy. No distended bowel loops.    Pelvis: Normal appendix. No adenopathy or pelvic  "mass.    Impression  The Leos catheter has slipped distally such that the balloon is in the penile urethra. The bladder is distended consistent with bladder outlet obstruction and there is bilateral hydronephrosis.          Signer Name: Ricky Arredondo MD  Signed: 7/26/2022 7:13 PM  Workstation Name: [id number]  [facility]     KUB: No results found for this or any previous visit.       Labs:  Brief Urine Lab Results       None          No visits with results within 3 Month(s) from this visit.   Latest known visit with results is:   Office Visit on 01/29/2025   Component Date Value Ref Range Status    Urine Culture 01/29/2025 >100,000 CFU/mL Klebsiella oxytoca (A)   Final    Urine Culture 01/29/2025 >100,000 CFU/mL Escherichia coli (A)   Final        Procedure:  Cath Change, Simple    Date/Time: 5/7/2025 3:22 PM    Performed by: Kushal Dixon PA-C  Authorized by: Kushal Dixon PA-C  Consent: Verbal consent obtained  Risks and benefits: risks, benefits and alternatives were discussed  Consent given by: patient  Patient understanding: patient states understanding of the procedure being performed  Patient consent: the patient's understanding of the procedure matches consent given  Procedure consent: procedure consent matches procedure scheduled  Relevant documents: relevant documents present and verified  Test results: test results available and properly labeled  Site marked: the operative site was marked  Imaging studies: imaging studies available  Patient identity confirmed: verbally with patient  Time out: Immediately prior to procedure a \"time out\" was called to verify the correct patient, procedure, equipment, support staff and site/side marked as required.  Preparation: Patient was prepped and draped in the usual sterile fashion.  Local anesthesia used: no    Anesthesia:  Local anesthesia used: no    Sedation:  Patient sedated: no    Patient tolerance: patient tolerated the " procedure well with no immediate complications           Assessment/Plan:   Problem List Items Addressed This Visit       Urinary catheter (Leos) change required - Primary       Health Maintenance:   Health Maintenance Due   Topic Date Due    Pneumococcal Vaccine 50+ (1 of 1 - PCV) Never done    ZOSTER VACCINE (1 of 2) Never done    RSV Vaccine - Adults (1 - 1-dose 75+ series) Never done    ANNUAL WELLNESS VISIT  Never done    COVID-19 Vaccine (3 - 2024-25 season) 09/01/2024        Smoking Counseling: Former smoker.  Never used smokeless tobacco.    Urine Incontinence: Patient reports that he is not currently experiencing any symptoms of urinary incontinence.    Patient was given instructions and counseling regarding his condition or for health maintenance advice. Please see specific information pulled into the AVS if appropriate.    Patient Education:   Leos catheter exchange -I did exchange the patient's 16 Kinyarwanda indwelling Leos catheter out in office today and received yellow urine in return.  No acute complications were noted and we will see him back in 1 month    Visit Diagnoses:    ICD-10-CM ICD-9-CM   1. Urinary catheter (Leos) change required  Z46.6 V53.6       Meds Ordered During Visit:  No orders of the defined types were placed in this encounter.      Follow Up Appointment: 1 month  No follow-ups on file.      This document has been electronically signed by Kushal Dixon PA-C   May 7, 2025 15:23 EDT    Part of this note may be an electronic transcription/translation of spoken language to printed text using the Dragon Dictation System.

## 2025-06-11 ENCOUNTER — OFFICE VISIT (OUTPATIENT)
Dept: UROLOGY | Facility: CLINIC | Age: OVER 89
End: 2025-06-11
Payer: MEDICARE

## 2025-06-11 VITALS
DIASTOLIC BLOOD PRESSURE: 91 MMHG | SYSTOLIC BLOOD PRESSURE: 134 MMHG | HEART RATE: 94 BPM | BODY MASS INDEX: 27.54 KG/M2 | WEIGHT: 192.4 LBS | HEIGHT: 70 IN

## 2025-06-11 DIAGNOSIS — Z46.6 URINARY CATHETER (FOLEY) CHANGE REQUIRED: Primary | ICD-10-CM

## 2025-06-11 DIAGNOSIS — N39.0 URINARY TRACT INFECTION WITHOUT HEMATURIA, SITE UNSPECIFIED: ICD-10-CM

## 2025-06-11 PROCEDURE — 87088 URINE BACTERIA CULTURE: CPT

## 2025-06-11 PROCEDURE — 87086 URINE CULTURE/COLONY COUNT: CPT

## 2025-06-11 PROCEDURE — 87186 SC STD MICRODIL/AGAR DIL: CPT

## 2025-06-11 NOTE — PROGRESS NOTES
"Chief Complaint:    Chief Complaint   Patient presents with    Urinary catheter (Leos) change required       Vital Signs:   /91 (BP Location: Right arm, Patient Position: Sitting, Cuff Size: Adult)   Pulse 94   Ht 177.8 cm (70\")   Wt 87.3 kg (192 lb 6.4 oz)   BMI 27.61 kg/m²   Body mass index is 27.61 kg/m².      HPI:  Stephenie Valdez Jr. is a 89 y.o. male who presents today for follow up    History of Present Illness  Mr. Valdez returns to the clinic today for follow-up for Leos catheter change.  He reports he is doing well but is concerned of possible acute urinary tract infection.  I did exchange his 16 French Leos catheter and received yellow urine in return.  I will send his urine off for culture      Past Medical History:  Past Medical History:   Diagnosis Date    Atrial fibrillation     Dementia     Enlarged prostate     Leos catheter in place     GERD (gastroesophageal reflux disease)     [place] (hard of hearing)     Renal disorder     Sleep apnea        Current Meds:  Current Outpatient Medications   Medication Sig Dispense Refill    imiquimod (ALDARA) 5 % cream Apply to warts 3 times weekly at bedtime.  Leave on for 6 to 10 hours. 12 each 1    warfarin (COUMADIN) 4 MG tablet Take 1 tablet by mouth Daily.      cefdinir (OMNICEF) 300 MG capsule Take 1 capsule by mouth Every 12 (Twelve) Hours. (Patient not taking: Reported on 3/6/2025) 20 capsule 0    clotrimazole-betamethasone (Lotrisone) 1-0.05 % cream Apply 1 application topically to the appropriate area as directed 2 (Two) Times a Day. (Patient not taking: Reported on 6/11/2025) 45 g 2    HYDROcodone-acetaminophen (NORCO) 5-325 MG per tablet Take 1-2 tablets by mouth Every 6 (Six) Hours As Needed (Pain). (Patient not taking: Reported on 6/11/2025) 12 tablet 0     No current facility-administered medications for this visit.        Allergies:   Allergies   Allergen Reactions    Penicillins Hives                      Past Surgical History:  Past " Surgical History:   Procedure Laterality Date    CIRCUMCISION N/A 3/11/2024    Procedure: CIRCUMCISION, FULGERATION OF WARTS, AND BIOPSY;  Surgeon: Jorge Alberto Francis MD;  Location: [facility];  Service: Urology;  Laterality: N/A;    COLONOSCOPY         Social History:  Social History     Socioeconomic History    Marital status:    Tobacco Use    Smoking status: Former    Smokeless tobacco: Never   Vaping Use    Vaping status: Never Used   Substance and Sexual Activity    Alcohol use: Never    Drug use: Never    Sexual activity: Defer       Family History:  Family History   Problem Relation Age of Onset    No Known Problems Father     No Known Problems Mother        Review of Systems:  Review of Systems   Constitutional:  Positive for fatigue. Negative for chills, fever and unexpected weight change.   HENT:  Negative for congestion and sinus pressure.    Respiratory:  Negative for chest tightness and shortness of breath.    Cardiovascular:  Negative for chest pain.   Gastrointestinal:  Negative for abdominal pain, constipation, diarrhea, nausea and vomiting.   Genitourinary:  Positive for difficulty urinating. Negative for dysuria, flank pain, frequency, hematuria and urgency.   Musculoskeletal:  Negative for back pain and neck pain.   Skin:  Negative for rash.   Neurological:  Negative for dizziness and headaches.   Hematological:  Bruises/bleeds easily.   Psychiatric/Behavioral:  Negative for confusion and suicidal ideas. The patient is not nervous/anxious.        Physical Exam:  Physical Exam  Constitutional:       General: He is not in acute distress.     Appearance: Normal appearance.   HENT:      Head: Normocephalic and atraumatic.      Nose: Nose normal.      Mouth/Throat:      Mouth: Mucous membranes are moist.   Eyes:      Conjunctiva/sclera: Conjunctivae normal.   Cardiovascular:      Rate and Rhythm: Normal rate.      Pulses: Normal pulses.   Pulmonary:      Effort: Pulmonary effort is normal.    Abdominal:      Palpations: Abdomen is soft.   Genitourinary:     Comments: Indwelling Leos catheter in place at this time with yellow urine noted in collection tubing and bag.  Musculoskeletal:         General: Normal range of motion.      Cervical back: Normal range of motion.   Skin:     General: Skin is warm.   Neurological:      General: No focal deficit present.      Mental Status: He is alert and oriented to person, place, and time.   Psychiatric:         Mood and Affect: Mood normal.         Behavior: Behavior normal.         Thought Content: Thought content normal.         Judgment: Judgment normal.           Recent Image (CT and/or KUB):   CT Abdomen and Pelvis: No results found for this or any previous visit.     CT Stone Protocol: Results for orders placed during the hospital encounter of 07/26/22    CT Abdomen Pelvis Stone Protocol    Narrative  CT Abdomen Pelvis WO    INDICATION:  Hematuria    TECHNIQUE:  CT of the abdomen and pelvis without IV contrast. Coronal and sagittal reconstructions were obtained.  Radiation dose reduction techniques included automated exposure control or exposure modulation based on body size. Count of known CT and cardiac nuc  med studies performed in previous 12 months: 1.    COMPARISON:  6/30/2022    FINDINGS:  Abdomen: Multiple hepatic cysts are again seen, unchanged. Nonspecific low-density splenic lesions are again noted, also unchanged. The pancreas is unremarkable. There is a 2 to 3 cm duodenal diverticulum again noted. Bilateral hydronephrosis is seen and  this has increased since the previous examination. Both ureters are dilated down to the bladder but no ureteral stones are seen. The bladder is distended. There is a Leos catheter. The balloon is within the penile urethra. No evidence of retroperitoneal  adenopathy. No distended bowel loops.    Pelvis: Normal appendix. No adenopathy or pelvic mass.    Impression  The Leos catheter has slipped distally such that  the balloon is in the penile urethra. The bladder is distended consistent with bladder outlet obstruction and there is bilateral hydronephrosis.          Signer Name: Ricky Arredondo MD  Signed: 7/26/2022 7:13 PM  Workstation Name: [id number]  [facility]     KUB: No results found for this or any previous visit.       Labs:  Brief Urine Lab Results       None          No visits with results within 3 Month(s) from this visit.   Latest known visit with results is:   Office Visit on 01/29/2025   Component Date Value Ref Range Status    Urine Culture 01/29/2025 >100,000 CFU/mL Klebsiella oxytoca (A)   Final    Urine Culture 01/29/2025 >100,000 CFU/mL Escherichia coli (A)   Final        Procedure:  Cath Change, Simple    Date/Time: 6/11/2025 2:01 PM    Performed by: Kushal Dixon PA-C  Authorized by: Kushal Dixon PA-C  Consent: Verbal consent obtained  Risks and benefits: risks, benefits and alternatives were discussed  Consent given by: patient  Patient understanding: patient states understanding of the procedure being performed  Patient consent: the patient's understanding of the procedure matches consent given  Procedure consent: procedure consent matches procedure scheduled  Relevant documents: relevant documents present and verified  Test results: test results available and properly labeled  Site marked: the operative site was marked  Patient identity confirmed: verbally with patient  Preparation: Patient was prepped and draped in the usual sterile fashion.  Local anesthesia used: yes    Anesthesia:  Local anesthesia used: yes  Local Anesthetic: topical anesthetic    Sedation:  Patient sedated: no    Patient tolerance: patient tolerated the procedure well with no immediate complications           Assessment/Plan:   Problem List Items Addressed This Visit       Urinary catheter (Leos) change required - Primary    Relevant Orders    Cath Change, Simple     Other Visit Diagnoses          Urinary tract infection without hematuria, site unspecified        Relevant Orders    Urine Culture - Urine, Urine, Clean Catch            Health Maintenance:   Health Maintenance Due   Topic Date Due    Pneumococcal Vaccine 50+ (1 of 1 - PCV) Never done    ZOSTER VACCINE (1 of 2) Never done    RSV Vaccine - Adults (1 - 1-dose 75+ series) Never done    ANNUAL WELLNESS VISIT  Never done    COVID-19 Vaccine (3 - 2024-25 season) 09/01/2024        Smoking Counseling: Former smoker.  Never used smokeless tobacco.    Urine Incontinence: Patient reports that he is not currently experiencing any symptoms of urinary incontinence.    Patient was given instructions and counseling regarding his condition or for health maintenance advice. Please see specific information pulled into the AVS if appropriate.    Patient Education:   Leos catheter exchange -exchange the patient's 16 [place] indwelling Leos catheter with no complications.  I will send his urine off for culture and call with results once available.  Otherwise we will see him back in 1 month    Visit Diagnoses:    ICD-10-CM ICD-9-CM   1. Urinary catheter (Leos) change required  Z46.6 V53.6   2. Urinary tract infection without hematuria, site unspecified  N39.0 599.0       Meds Ordered During Visit:  No orders of the defined types were placed in this encounter.      Follow Up Appointment: 1 month  No follow-ups on file.      This document has been electronically signed by Kushal Dixon PA-C   June 11, 2025 15:07 EDT    Part of this note may be an electronic transcription/translation of spoken language to printed text using the Dragon Dictation System.

## 2025-06-13 ENCOUNTER — RESULTS FOLLOW-UP (OUTPATIENT)
Dept: UROLOGY | Facility: CLINIC | Age: OVER 89
End: 2025-06-13
Payer: MEDICARE

## 2025-06-13 LAB — BACTERIA SPEC AEROBE CULT: ABNORMAL

## 2025-06-13 NOTE — TELEPHONE ENCOUNTER
Called and spoke to patient's wife about test results.  Advised he will most likely require IV antibiotics given decreased GFR and risk of Macrobid.  We will attempt to get this set up with his home health care.  I will try to call his primary care provider for most recent CMP results.  Otherwise I will call the patient's wife back on Monday with confirmation of definite treatment.  Advised her to take him onto the ER if he has any worsening conditions.  She verbalized understanding.

## 2025-06-13 NOTE — TELEPHONE ENCOUNTER
Spoke to Cassy and I will do his IM injections at home.  Will get him set up for Invanz 1 g daily for 7 days.  Will get this through [facility].  Will have him repeat a BMP on his fourth day of injections as well as an anaphylaxis kit.

## 2025-06-16 ENCOUNTER — TELEPHONE (OUTPATIENT)
Dept: UROLOGY | Facility: CLINIC | Age: OVER 89
End: 2025-06-16
Payer: MEDICARE

## 2025-06-16 DIAGNOSIS — Z16.12 UTI DUE TO EXTENDED-SPECTRUM BETA LACTAMASE (ESBL) PRODUCING ESCHERICHIA COLI: Primary | ICD-10-CM

## 2025-06-16 DIAGNOSIS — N39.0 UTI DUE TO EXTENDED-SPECTRUM BETA LACTAMASE (ESBL) PRODUCING ESCHERICHIA COLI: Primary | ICD-10-CM

## 2025-06-16 DIAGNOSIS — B96.29 UTI DUE TO EXTENDED-SPECTRUM BETA LACTAMASE (ESBL) PRODUCING ESCHERICHIA COLI: Primary | ICD-10-CM

## 2025-06-16 NOTE — TELEPHONE ENCOUNTER
Patient's wife called and wanted to know if there was a way we could set him up with the antibiotics to be done in the hospital or any other options besides home health, they are asking for $1100.00 up front to do them. She would like a call back please.

## 2025-06-16 NOTE — TELEPHONE ENCOUNTER
Routing to Samson. He said that if they wanted to come to [place] for iv or I'm injections we could get that set up. Routing back to Samson they said they would come to [facility] for this.

## 2025-06-17 ENCOUNTER — OFFICE VISIT (OUTPATIENT)
Dept: SURGERY | Facility: CLINIC | Age: OVER 89
End: 2025-06-17
Payer: MEDICARE

## 2025-06-17 ENCOUNTER — HOSPITAL ENCOUNTER (OUTPATIENT)
Dept: INFUSION THERAPY | Facility: HOSPITAL | Age: OVER 89
Discharge: HOME | End: 2025-06-17
Payer: MEDICARE

## 2025-06-17 VITALS
OXYGEN SATURATION: 95 % | DIASTOLIC BLOOD PRESSURE: 86 MMHG | SYSTOLIC BLOOD PRESSURE: 138 MMHG | HEART RATE: 94 BPM | TEMPERATURE: 98.5 F

## 2025-06-17 VITALS — BODY MASS INDEX: 27.49 KG/M2 | WEIGHT: 192 LBS | HEIGHT: 70 IN

## 2025-06-17 DIAGNOSIS — N39.0 UTI DUE TO EXTENDED-SPECTRUM BETA LACTAMASE (ESBL) PRODUCING ESCHERICHIA COLI: Primary | ICD-10-CM

## 2025-06-17 DIAGNOSIS — Z16.12 UTI DUE TO EXTENDED-SPECTRUM BETA LACTAMASE (ESBL) PRODUCING ESCHERICHIA COLI: Primary | ICD-10-CM

## 2025-06-17 DIAGNOSIS — B96.29 UTI DUE TO EXTENDED-SPECTRUM BETA LACTAMASE (ESBL) PRODUCING ESCHERICHIA COLI: Primary | ICD-10-CM

## 2025-06-17 LAB
ANION GAP SERPL CALCULATED.3IONS-SCNC: 10.7 MMOL/L (ref 5–15)
BUN SERPL-MCNC: 16.8 MG/DL (ref 8–23)
BUN/CREAT SERPL: 15.4 (ref 7–25)
CALCIUM SPEC-SCNC: 8.2 MG/DL (ref 8.2–9.6)
CHLORIDE SERPL-SCNC: 106 MMOL/L (ref 98–107)
CO2 SERPL-SCNC: 24.3 MMOL/L (ref 22–29)
CREAT SERPL-MCNC: 1.09 MG/DL (ref 0.76–1.27)
EGFRCR SERPLBLD CKD-EPI 2021: 63.7 ML/MIN/1.73
GLUCOSE SERPL-MCNC: 91 MG/DL (ref 65–99)
POTASSIUM SERPL-SCNC: 4.4 MMOL/L (ref 3.5–5.2)
SODIUM SERPL-SCNC: 141 MMOL/L (ref 136–145)

## 2025-06-17 PROCEDURE — 25010000002 ERTAPENEM PER 500 MG

## 2025-06-17 PROCEDURE — 96365 THER/PROPH/DIAG IV INF INIT: CPT

## 2025-06-17 PROCEDURE — 80048 BASIC METABOLIC PNL TOTAL CA: CPT

## 2025-06-17 RX ADMIN — ERTAPENEM SODIUM 1000 MG: 1 INJECTION INTRAMUSCULAR; INTRAVENOUS at 14:17

## 2025-06-17 NOTE — PROGRESS NOTES
Subjective   Stephenie Valdez Jr. is a 89 y.o. male who presents today for Initial Evaluation    Chief Complaint:    Chief Complaint   Patient presents with    mid line        History of Present Illness:    History of Present Illness Stephenie is a 89-year-old male who presents for evaluation for midline placement.  He reports he is receiving a midline so that he can receive IV antibiotics for 7 days.  Currently receiving IV antibiotics for a UTI due to ESBL producing E. coli.  He denies any dysuria.  States that he has a leg bag.    The following portions of the patient's history were reviewed and updated as appropriate: allergies, current medications, past family history, past medical history, past social history, past surgical history and problem list.    Past Medical History:  Past Medical History:   Diagnosis Date    Atrial fibrillation     Dementia     Enlarged prostate     Leos catheter in place     GERD (gastroesophageal reflux disease)     [place] (hard of hearing)     Renal disorder     Sleep apnea        Social History:  Social History     Socioeconomic History    Marital status:    Tobacco Use    Smoking status: Former     Passive exposure: Past    Smokeless tobacco: Never   Vaping Use    Vaping status: Never Used   Substance and Sexual Activity    Alcohol use: Never    Drug use: Never    Sexual activity: Defer       Family History:  Family History   Problem Relation Age of Onset    No Known Problems Father     No Known Problems Mother        Past Surgical History:  Past Surgical History:   Procedure Laterality Date    CIRCUMCISION N/A 3/11/2024    Procedure: CIRCUMCISION, FULGERATION OF WARTS, AND BIOPSY;  Surgeon: Jorge Alberto Francis MD;  Location: [facility];  Service: Urology;  Laterality: N/A;    COLONOSCOPY         Problem List:  Patient Active Problem List   Diagnosis    Urinary retention due to benign prostatic hyperplasia    Balanitis    Urinary catheter (Leos) change required    Genital warts  "   Phimosis of penis    Penile cancer    UTI due to extended-spectrum beta lactamase (ESBL) producing Escherichia coli       Allergy:   Allergies   Allergen Reactions    Penicillins Hives                      Current Medications:   Current Outpatient Medications   Medication Sig Dispense Refill    cefdinir (OMNICEF) 300 MG capsule Take 1 capsule by mouth Every 12 (Twelve) Hours. 20 capsule 0    clotrimazole-betamethasone (Lotrisone) 1-0.05 % cream Apply 1 application topically to the appropriate area as directed 2 (Two) Times a Day. 45 g 2    HYDROcodone-acetaminophen (NORCO) 5-325 MG per tablet Take 1-2 tablets by mouth Every 6 (Six) Hours As Needed (Pain). 12 tablet 0    imiquimod (ALDARA) 5 % cream Apply to warts 3 times weekly at bedtime.  Leave on for 6 to 10 hours. 12 each 1    warfarin (COUMADIN) 4 MG tablet Take 1 tablet by mouth Daily.       No current facility-administered medications for this visit.       Review of Systems:    Review of Systems   Genitourinary:  Positive for dysuria.         Physical Exam:   Physical Exam  Constitutional:       Appearance: Normal appearance.   HENT:      Head: Normocephalic and atraumatic.      Right Ear: External ear normal.      Left Ear: External ear normal.   Eyes:      Conjunctiva/sclera: Conjunctivae normal.   Cardiovascular:      Pulses: Normal pulses.   Pulmonary:      Effort: Pulmonary effort is normal.   Abdominal:      General: Abdomen is flat.      Palpations: Abdomen is soft.   Musculoskeletal:      Cervical back: Normal range of motion.      Comments: Presents with cane   Skin:     General: Skin is warm and dry.      Capillary Refill: Capillary refill takes less than 2 seconds.   Neurological:      General: No focal deficit present.      Mental Status: He is alert and oriented to person, place, and time.   Psychiatric:         Mood and Affect: Mood normal.         Behavior: Behavior normal.         Vitals:  Height 177.8 cm (70\"), weight 87.1 kg (192 lb). "   Body mass index is 27.55 kg/m².     Lab Results:   Office Visit on 06/11/2025   Component Date Value Ref Range Status    Urine Culture 06/11/2025 >100,000 CFU/mL Escherichia coli ESBL (A)   Final     Procedure: Veins of left upper extremity visualized using ultrasound.  Patient was prepped and draped in sterile manner as well as ultrasound probe.  Lidocaine was used local anesthesia.  18-gauge needle was used to cannulate patient's basilic vein.  Blood return was noted.  Guidewire passed with ease to an extent, however it stopped passing.  Needle was removed, 18-gauge powerglide catheter was threaded over the guidewire.  Guidewire was then removed and extension set was attached.  I did not note blood return.  Catheter was removed.  This attempt was terminated.  I then opened a new kit.  Reprepped and redraped the patient as well as ultrasound.  Used 18-gauge needle to cannulate patient's left basilic vein.  Blood return was noted.  Guidewire passed with ease.  Needle was removed.  18-gauge powerglide catheter was threaded over the guidewire.  Guidewire was then removed and an extension set was attached.  Noted to flush with ease and return blood.  Patient tolerated well.  Sterile dressing was placed.  No complications noted.    Assessment & Plan   Diagnoses and all orders for this visit:    1. UTI due to extended-spectrum beta lactamase (ESBL) producing Escherichia coli (Primary)    Stephenie is a 89-year-old male who presents for evaluation for midline due to receiving IV antibiotics for UTI due to ESBL producing E. coli.  I placed midline to patient's left upper extremity in office today.  Tolerated well.  Will follow-up as needed.    Visit Diagnoses:    ICD-10-CM ICD-9-CM   1. UTI due to extended-spectrum beta lactamase (ESBL) producing Escherichia coli  N39.0 599.0    B96.29 041.49    Z16.12 V09.1         MEDS ORDERED DURING VISIT:  No orders of the defined types were placed in this encounter.      Return if  symptoms worsen or fail to improve.             This document has been electronically signed by JAYDEN Hess  June 17, 2025 14:03 EDT    Please note that portions of this note were completed with a voice recognition program.

## 2025-06-18 ENCOUNTER — RESULTS FOLLOW-UP (OUTPATIENT)
Dept: ONCOLOGY | Facility: HOSPITAL | Age: OVER 89
End: 2025-06-18
Payer: MEDICARE

## 2025-06-18 ENCOUNTER — HOSPITAL ENCOUNTER (OUTPATIENT)
Dept: INFUSION THERAPY | Facility: HOSPITAL | Age: OVER 89
Discharge: HOME | End: 2025-06-18
Payer: MEDICARE

## 2025-06-18 VITALS
RESPIRATION RATE: 18 BRPM | HEART RATE: 96 BPM | DIASTOLIC BLOOD PRESSURE: 78 MMHG | TEMPERATURE: 97.8 F | OXYGEN SATURATION: 96 % | SYSTOLIC BLOOD PRESSURE: 137 MMHG

## 2025-06-18 DIAGNOSIS — Z16.12 UTI DUE TO EXTENDED-SPECTRUM BETA LACTAMASE (ESBL) PRODUCING ESCHERICHIA COLI: Primary | ICD-10-CM

## 2025-06-18 DIAGNOSIS — N39.0 UTI DUE TO EXTENDED-SPECTRUM BETA LACTAMASE (ESBL) PRODUCING ESCHERICHIA COLI: Primary | ICD-10-CM

## 2025-06-18 DIAGNOSIS — B96.29 UTI DUE TO EXTENDED-SPECTRUM BETA LACTAMASE (ESBL) PRODUCING ESCHERICHIA COLI: Primary | ICD-10-CM

## 2025-06-18 PROCEDURE — 25010000002 ERTAPENEM PER 500 MG

## 2025-06-18 PROCEDURE — 96365 THER/PROPH/DIAG IV INF INIT: CPT

## 2025-06-18 RX ADMIN — ERTAPENEM 1000 MG: 1 INJECTION, POWDER, LYOPHILIZED, FOR SOLUTION INTRAMUSCULAR; INTRAVENOUS at 13:50

## 2025-06-18 NOTE — TELEPHONE ENCOUNTER
I called the pt and left a voicemail letting him know that his kidney function improved and to continue with his IV antibiotics.                           ----- Message from Kushal Dixon sent at 6/18/2025  8:11 AM EDT -----  Please let patient's family know that kidney function had improved back to within normal limits and recommend to continue with his IV antibiotics.  Thank you.  ----- Message -----  From: Lab, Background User  Sent: 6/17/2025   3:11 PM EDT  To: Kushal Dixon PA-C

## 2025-06-19 ENCOUNTER — HOSPITAL ENCOUNTER (OUTPATIENT)
Dept: INFUSION THERAPY | Facility: HOSPITAL | Age: OVER 89
Discharge: HOME | End: 2025-06-19
Payer: MEDICARE

## 2025-06-19 VITALS
HEART RATE: 99 BPM | SYSTOLIC BLOOD PRESSURE: 139 MMHG | DIASTOLIC BLOOD PRESSURE: 87 MMHG | RESPIRATION RATE: 18 BRPM | OXYGEN SATURATION: 95 % | TEMPERATURE: 97.7 F

## 2025-06-19 DIAGNOSIS — Z16.12 UTI DUE TO EXTENDED-SPECTRUM BETA LACTAMASE (ESBL) PRODUCING ESCHERICHIA COLI: Primary | ICD-10-CM

## 2025-06-19 DIAGNOSIS — N39.0 UTI DUE TO EXTENDED-SPECTRUM BETA LACTAMASE (ESBL) PRODUCING ESCHERICHIA COLI: Primary | ICD-10-CM

## 2025-06-19 DIAGNOSIS — B96.29 UTI DUE TO EXTENDED-SPECTRUM BETA LACTAMASE (ESBL) PRODUCING ESCHERICHIA COLI: Primary | ICD-10-CM

## 2025-06-19 PROCEDURE — 96365 THER/PROPH/DIAG IV INF INIT: CPT

## 2025-06-19 PROCEDURE — 25010000002 ERTAPENEM PER 500 MG

## 2025-06-19 RX ADMIN — ERTAPENEM 1000 MG: 1 INJECTION, POWDER, LYOPHILIZED, FOR SOLUTION INTRAMUSCULAR; INTRAVENOUS at 13:22

## 2025-06-20 ENCOUNTER — HOSPITAL ENCOUNTER (OUTPATIENT)
Dept: INFUSION THERAPY | Facility: HOSPITAL | Age: OVER 89
Discharge: HOME | End: 2025-06-20
Payer: MEDICARE

## 2025-06-20 VITALS
DIASTOLIC BLOOD PRESSURE: 73 MMHG | SYSTOLIC BLOOD PRESSURE: 124 MMHG | OXYGEN SATURATION: 95 % | HEART RATE: 83 BPM | TEMPERATURE: 98.3 F | RESPIRATION RATE: 18 BRPM

## 2025-06-20 DIAGNOSIS — B96.29 UTI DUE TO EXTENDED-SPECTRUM BETA LACTAMASE (ESBL) PRODUCING ESCHERICHIA COLI: Primary | ICD-10-CM

## 2025-06-20 DIAGNOSIS — N39.0 UTI DUE TO EXTENDED-SPECTRUM BETA LACTAMASE (ESBL) PRODUCING ESCHERICHIA COLI: Primary | ICD-10-CM

## 2025-06-20 DIAGNOSIS — Z16.12 UTI DUE TO EXTENDED-SPECTRUM BETA LACTAMASE (ESBL) PRODUCING ESCHERICHIA COLI: Primary | ICD-10-CM

## 2025-06-20 PROCEDURE — 96365 THER/PROPH/DIAG IV INF INIT: CPT

## 2025-06-20 PROCEDURE — 25010000002 ERTAPENEM PER 500 MG

## 2025-06-20 RX ADMIN — ERTAPENEM 1000 MG: 1 INJECTION, POWDER, LYOPHILIZED, FOR SOLUTION INTRAMUSCULAR; INTRAVENOUS at 13:21

## 2025-06-21 ENCOUNTER — HOSPITAL ENCOUNTER (OUTPATIENT)
Dept: INFUSION THERAPY | Facility: HOSPITAL | Age: OVER 89
Discharge: HOME | End: 2025-06-21
Payer: MEDICARE

## 2025-06-21 DIAGNOSIS — N39.0 UTI DUE TO EXTENDED-SPECTRUM BETA LACTAMASE (ESBL) PRODUCING ESCHERICHIA COLI: Primary | ICD-10-CM

## 2025-06-21 DIAGNOSIS — Z16.12 UTI DUE TO EXTENDED-SPECTRUM BETA LACTAMASE (ESBL) PRODUCING ESCHERICHIA COLI: Primary | ICD-10-CM

## 2025-06-21 DIAGNOSIS — B96.29 UTI DUE TO EXTENDED-SPECTRUM BETA LACTAMASE (ESBL) PRODUCING ESCHERICHIA COLI: Primary | ICD-10-CM

## 2025-06-21 PROCEDURE — 25010000002 ERTAPENEM PER 500 MG

## 2025-06-21 PROCEDURE — 96365 THER/PROPH/DIAG IV INF INIT: CPT

## 2025-06-21 RX ADMIN — ERTAPENEM 1000 MG: 1 INJECTION, POWDER, LYOPHILIZED, FOR SOLUTION INTRAMUSCULAR; INTRAVENOUS at 08:07

## 2025-06-22 ENCOUNTER — HOSPITAL ENCOUNTER (OUTPATIENT)
Dept: INFUSION THERAPY | Facility: HOSPITAL | Age: OVER 89
Discharge: HOME | End: 2025-06-22
Payer: MEDICARE

## 2025-06-22 DIAGNOSIS — N39.0 UTI DUE TO EXTENDED-SPECTRUM BETA LACTAMASE (ESBL) PRODUCING ESCHERICHIA COLI: Primary | ICD-10-CM

## 2025-06-22 DIAGNOSIS — B96.29 UTI DUE TO EXTENDED-SPECTRUM BETA LACTAMASE (ESBL) PRODUCING ESCHERICHIA COLI: Primary | ICD-10-CM

## 2025-06-22 DIAGNOSIS — Z16.12 UTI DUE TO EXTENDED-SPECTRUM BETA LACTAMASE (ESBL) PRODUCING ESCHERICHIA COLI: Primary | ICD-10-CM

## 2025-06-22 PROCEDURE — 96365 THER/PROPH/DIAG IV INF INIT: CPT

## 2025-06-22 PROCEDURE — 25010000002 ERTAPENEM PER 500 MG

## 2025-06-22 RX ADMIN — ERTAPENEM 1000 MG: 1 INJECTION, POWDER, LYOPHILIZED, FOR SOLUTION INTRAMUSCULAR; INTRAVENOUS at 08:05

## 2025-06-23 ENCOUNTER — HOSPITAL ENCOUNTER (OUTPATIENT)
Dept: INFUSION THERAPY | Facility: HOSPITAL | Age: OVER 89
Discharge: HOME | End: 2025-06-23
Payer: MEDICARE

## 2025-06-23 VITALS
RESPIRATION RATE: 18 BRPM | SYSTOLIC BLOOD PRESSURE: 126 MMHG | HEART RATE: 92 BPM | OXYGEN SATURATION: 96 % | DIASTOLIC BLOOD PRESSURE: 81 MMHG | TEMPERATURE: 98.8 F

## 2025-06-23 DIAGNOSIS — Z16.12 UTI DUE TO EXTENDED-SPECTRUM BETA LACTAMASE (ESBL) PRODUCING ESCHERICHIA COLI: Primary | ICD-10-CM

## 2025-06-23 DIAGNOSIS — N39.0 UTI DUE TO EXTENDED-SPECTRUM BETA LACTAMASE (ESBL) PRODUCING ESCHERICHIA COLI: Primary | ICD-10-CM

## 2025-06-23 DIAGNOSIS — B96.29 UTI DUE TO EXTENDED-SPECTRUM BETA LACTAMASE (ESBL) PRODUCING ESCHERICHIA COLI: Primary | ICD-10-CM

## 2025-06-23 PROCEDURE — 25010000002 ERTAPENEM PER 500 MG

## 2025-06-23 PROCEDURE — 96365 THER/PROPH/DIAG IV INF INIT: CPT

## 2025-06-23 RX ADMIN — ERTAPENEM 1000 MG: 1 INJECTION, POWDER, LYOPHILIZED, FOR SOLUTION INTRAMUSCULAR; INTRAVENOUS at 13:51

## 2025-07-16 ENCOUNTER — OFFICE VISIT (OUTPATIENT)
Dept: UROLOGY | Facility: CLINIC | Age: OVER 89
End: 2025-07-16
Payer: MEDICARE

## 2025-07-16 VITALS — WEIGHT: 184 LBS | HEIGHT: 70 IN | BODY MASS INDEX: 26.34 KG/M2

## 2025-07-16 DIAGNOSIS — N39.0 UTI DUE TO EXTENDED-SPECTRUM BETA LACTAMASE (ESBL) PRODUCING ESCHERICHIA COLI: Primary | ICD-10-CM

## 2025-07-16 DIAGNOSIS — Z16.12 UTI DUE TO EXTENDED-SPECTRUM BETA LACTAMASE (ESBL) PRODUCING ESCHERICHIA COLI: Primary | ICD-10-CM

## 2025-07-16 DIAGNOSIS — Z46.6 URINARY CATHETER (FOLEY) CHANGE REQUIRED: ICD-10-CM

## 2025-07-16 DIAGNOSIS — B96.29 UTI DUE TO EXTENDED-SPECTRUM BETA LACTAMASE (ESBL) PRODUCING ESCHERICHIA COLI: Primary | ICD-10-CM

## 2025-07-16 PROCEDURE — 87086 URINE CULTURE/COLONY COUNT: CPT

## 2025-07-16 PROCEDURE — 87088 URINE BACTERIA CULTURE: CPT

## 2025-07-16 PROCEDURE — 87186 SC STD MICRODIL/AGAR DIL: CPT

## 2025-07-16 RX ORDER — NITROFURANTOIN 25; 75 MG/1; MG/1
100 CAPSULE ORAL EVERY 12 HOURS
Qty: 10 CAPSULE | Refills: 0 | Status: SHIPPED | OUTPATIENT
Start: 2025-07-16

## 2025-07-16 NOTE — PROGRESS NOTES
"Chief Complaint:    Chief Complaint   Patient presents with    Urinary catheter (Leos) change required     Cath change        Vital Signs:   Ht 177.8 cm (70\")   Wt 83.5 kg (184 lb)   BMI 26.40 kg/m²   Body mass index is 26.4 kg/m².      HPI:  Stephenie Valdez Jr. is a 89 y.o. male who presents today for follow up    History of Present Illness  Mr. Valdez returns to the clinic today for Leos catheter exchange.  He was last seen 1 month ago and had a urine culture completed that did come back with ESBL E. coli.  He was treated appropriately with Invanz and has been doing well.  He returns today for repeat cath exchange.  His urine has been very dark and we did send this back off for culture.  Will place him on Macrobid for prophylaxis pending culture results.  I did replace the patient's 16 French Leos catheter with yellow urine noted and return      Past Medical History:  Past Medical History:   Diagnosis Date    Atrial fibrillation     Dementia     Enlarged prostate     Leos catheter in place     GERD (gastroesophageal reflux disease)     [place] (hard of hearing)     Renal disorder     Sleep apnea        Current Meds:  Current Outpatient Medications   Medication Sig Dispense Refill    cefdinir (OMNICEF) 300 MG capsule Take 1 capsule by mouth Every 12 (Twelve) Hours. 20 capsule 0    clotrimazole-betamethasone (Lotrisone) 1-0.05 % cream Apply 1 application topically to the appropriate area as directed 2 (Two) Times a Day. 45 g 2    HYDROcodone-acetaminophen (NORCO) 5-325 MG per tablet Take 1-2 tablets by mouth Every 6 (Six) Hours As Needed (Pain). 12 tablet 0    imiquimod (ALDARA) 5 % cream Apply to warts 3 times weekly at bedtime.  Leave on for 6 to 10 hours. 12 each 1    nitrofurantoin, macrocrystal-monohydrate, (Macrobid) 100 MG capsule Take 1 capsule by mouth Every 12 (Twelve) Hours. 10 capsule 0    warfarin (COUMADIN) 4 MG tablet Take 1 tablet by mouth Daily.       No current facility-administered medications for " this visit.        Allergies:   Allergies   Allergen Reactions    Penicillins Hives                      Past Surgical History:  Past Surgical History:   Procedure Laterality Date    CIRCUMCISION N/A 3/11/2024    Procedure: CIRCUMCISION, FULGERATION OF WARTS, AND BIOPSY;  Surgeon: Jorge Alberto Francis MD;  Location: [facility];  Service: Urology;  Laterality: N/A;    COLONOSCOPY         Social History:  Social History     Socioeconomic History    Marital status:    Tobacco Use    Smoking status: Former     Passive exposure: Past    Smokeless tobacco: Never   Vaping Use    Vaping status: Never Used   Substance and Sexual Activity    Alcohol use: Never    Drug use: Never    Sexual activity: Defer       Family History:  Family History   Problem Relation Age of Onset    No Known Problems Father     No Known Problems Mother        Review of Systems:  Review of Systems   Constitutional:  Negative for chills, fatigue, fever and unexpected weight change.   HENT:  Negative for congestion and sinus pressure.    Respiratory:  Negative for chest tightness and shortness of breath.    Cardiovascular:  Negative for chest pain.   Gastrointestinal:  Negative for abdominal pain, constipation, diarrhea, nausea and vomiting.   Genitourinary:  Positive for difficulty urinating. Negative for dysuria, flank pain, frequency, hematuria and urgency.   Musculoskeletal:  Negative for back pain and neck pain.   Skin:  Negative for rash.   Neurological:  Negative for dizziness and headaches.   Hematological:  Bruises/bleeds easily.   Psychiatric/Behavioral:  Negative for confusion and suicidal ideas. The patient is not nervous/anxious.        Physical Exam:  Physical Exam  Constitutional:       General: He is not in acute distress.     Appearance: Normal appearance.   HENT:      Head: Normocephalic and atraumatic.      Nose: Nose normal.      Mouth/Throat:      Mouth: Mucous membranes are moist.   Eyes:      Conjunctiva/sclera:  Conjunctivae normal.   Cardiovascular:      Rate and Rhythm: Normal rate.      Pulses: Normal pulses.   Pulmonary:      Effort: Pulmonary effort is normal.   Abdominal:      Palpations: Abdomen is soft.   Genitourinary:     Comments: Indwelling Leos catheter in place with dark yellow urine noted in collection tubing and bag  Musculoskeletal:         General: Normal range of motion.      Cervical back: Normal range of motion.   Skin:     General: Skin is warm.   Neurological:      General: No focal deficit present.      Mental Status: He is alert and oriented to person, place, and time.   Psychiatric:         Mood and Affect: Mood normal.         Behavior: Behavior normal.         Thought Content: Thought content normal.         Judgment: Judgment normal.           Recent Image (CT and/or KUB):   CT Abdomen and Pelvis: No results found for this or any previous visit.     CT Stone Protocol: Results for orders placed during the hospital encounter of 07/26/22    CT Abdomen Pelvis Stone Protocol    Narrative  CT Abdomen Pelvis WO    INDICATION:  Hematuria    TECHNIQUE:  CT of the abdomen and pelvis without IV contrast. Coronal and sagittal reconstructions were obtained.  Radiation dose reduction techniques included automated exposure control or exposure modulation based on body size. Count of known CT and cardiac nuc  med studies performed in previous 12 months: 1.    COMPARISON:  6/30/2022    FINDINGS:  Abdomen: Multiple hepatic cysts are again seen, unchanged. Nonspecific low-density splenic lesions are again noted, also unchanged. The pancreas is unremarkable. There is a 2 to 3 cm duodenal diverticulum again noted. Bilateral hydronephrosis is seen and  this has increased since the previous examination. Both ureters are dilated down to the bladder but no ureteral stones are seen. The bladder is distended. There is a Leos catheter. The balloon is within the penile urethra. No evidence of retroperitoneal  adenopathy.  No distended bowel loops.    Pelvis: Normal appendix. No adenopathy or pelvic mass.    Impression  The Leos catheter has slipped distally such that the balloon is in the penile urethra. The bladder is distended consistent with bladder outlet obstruction and there is bilateral hydronephrosis.          Signer Name: Ricky Arredondo MD  Signed: 7/26/2022 7:13 PM  Workstation Name: [id number]  [facility]     KUB: No results found for this or any previous visit.       Labs:  Brief Urine Lab Results       None          Hospital Outpatient Visit on 06/17/2025   Component Date Value Ref Range Status    Glucose 06/17/2025 91  65 - 99 mg/dL Final    BUN 06/17/2025 16.8  8.0 - 23.0 mg/dL Final    Creatinine 06/17/2025 1.09  0.76 - 1.27 mg/dL Final    Sodium 06/17/2025 141  136 - 145 mmol/L Final    Potassium 06/17/2025 4.4  3.5 - 5.2 mmol/L Final    Specimen hemolyzed.  Result may be falsely elevated.    Chloride 06/17/2025 106  98 - 107 mmol/L Final    CO2 06/17/2025 24.3  22.0 - 29.0 mmol/L Final    Calcium 06/17/2025 8.2  8.2 - 9.6 mg/dL Final    BUN/Creatinine Ratio 06/17/2025 15.4  7.0 - 25.0 Final    Anion Gap 06/17/2025 10.7  5.0 - 15.0 mmol/L Final    eGFR 06/17/2025 63.7  >60.0 mL/min/1.73 Final   Office Visit on 06/11/2025   Component Date Value Ref Range Status    Urine Culture 06/11/2025 >100,000 CFU/mL Escherichia coli ESBL (A)   Final        Procedure:  Cath Change, Simple    Date/Time: 7/16/2025 3:37 PM    Performed by: Kushal Dixon PA-C  Authorized by: Kushal Dixon PA-C  Consent: Verbal consent obtained  Risks and benefits: risks, benefits and alternatives were discussed  Consent given by: patient  Patient understanding: patient states understanding of the procedure being performed  Patient consent: the patient's understanding of the procedure matches consent given  Procedure consent: procedure consent matches procedure scheduled  Relevant documents: relevant documents present  "and verified  Test results: test results available and properly labeled  Site marked: the operative site was marked  Required items: required blood products, implants, devices, and special equipment available  Patient identity confirmed: verbally with patient  Time out: Immediately prior to procedure a \"time out\" was called to verify the correct patient, procedure, equipment, support staff and site/side marked as required.  Preparation: Patient was prepped and draped in the usual sterile fashion.  Local anesthesia used: no    Anesthesia:  Local anesthesia used: no    Sedation:  Patient sedated: no    Patient tolerance: patient tolerated the procedure well with no immediate complications           Assessment/Plan:   Problem List Items Addressed This Visit       Urinary catheter (Leos) change required    UTI due to extended-spectrum beta lactamase (ESBL) producing Escherichia coli - Primary    Relevant Medications    nitrofurantoin, macrocrystal-monohydrate, (Macrobid) 100 MG capsule    Other Relevant Orders    Urine Culture - Urine, Urine, Catheter In/Out       Health Maintenance:   Health Maintenance Due   Topic Date Due    Pneumococcal Vaccine 50+ (1 of 1 - PCV) Never done    ZOSTER VACCINE (1 of 2) Never done    RSV Vaccine - Adults (1 - 1-dose 75+ series) Never done    ANNUAL WELLNESS VISIT  Never done    COVID-19 Vaccine (3 - 2024-25 season) 09/01/2024        Smoking Counseling: Former smoker.  Never used smokeless tobacco.    Urine Incontinence: Patient reports that he is not currently experiencing any symptoms of urinary incontinence.    Patient was given instructions and counseling regarding his condition or for health maintenance advice. Please see specific information pulled into the AVS if appropriate.    Patient Education:   Leos catheter exchange -I did exchange the patient's 16 Hungarian Leos catheter in office today and received yellow urine in return.  No complications were noted and we will have him " follow-up in 1 month for repeat cath exchange  ESBL UTI -I will send the patient's urine off for culture and call with results once available.  Will start him on Macrobid pending culture results.  Otherwise we will see him back in 1 month    Visit Diagnoses:    ICD-10-CM ICD-9-CM   1. UTI due to extended-spectrum beta lactamase (ESBL) producing Escherichia coli  N39.0 599.0    B96.29 041.49    Z16.12 V09.1   2. Urinary catheter ([place]) change required  Z46.6 V53.6       Meds Ordered During Visit:  New Medications Ordered This Visit   Medications    nitrofurantoin, macrocrystal-monohydrate, (Macrobid) 100 MG capsule     Sig: Take 1 capsule by mouth Every 12 (Twelve) Hours.     Dispense:  10 capsule     Refill:  0       Follow Up Appointment: 1 month  No follow-ups on file.      This document has been electronically signed by Kushal Dixon PA-C   July 16, 2025 15:37 EDT    Part of this note may be an electronic transcription/translation of spoken language to printed text using the Dragon Dictation System.

## 2025-07-18 ENCOUNTER — RESULTS FOLLOW-UP (OUTPATIENT)
Dept: UROLOGY | Facility: CLINIC | Age: OVER 89
End: 2025-07-18
Payer: MEDICARE

## 2025-07-18 ENCOUNTER — TELEPHONE (OUTPATIENT)
Dept: UROLOGY | Facility: CLINIC | Age: OVER 89
End: 2025-07-18
Payer: MEDICARE

## 2025-07-18 DIAGNOSIS — Z16.12 UTI DUE TO EXTENDED-SPECTRUM BETA LACTAMASE (ESBL) PRODUCING ESCHERICHIA COLI: Primary | ICD-10-CM

## 2025-07-18 DIAGNOSIS — N39.0 UTI DUE TO EXTENDED-SPECTRUM BETA LACTAMASE (ESBL) PRODUCING ESCHERICHIA COLI: Primary | ICD-10-CM

## 2025-07-18 DIAGNOSIS — B96.29 UTI DUE TO EXTENDED-SPECTRUM BETA LACTAMASE (ESBL) PRODUCING ESCHERICHIA COLI: Primary | ICD-10-CM

## 2025-07-18 LAB — BACTERIA SPEC AEROBE CULT: ABNORMAL

## 2025-07-18 RX ORDER — NITROFURANTOIN 25; 75 MG/1; MG/1
CAPSULE ORAL
Qty: 30 CAPSULE | Refills: 0 | Status: SHIPPED | OUTPATIENT
Start: 2025-07-18

## 2025-07-18 NOTE — TELEPHONE ENCOUNTER
I called the pt and spoke to his wife going over positive urine culture results and informing her of the antibiotic was called in and went over how to take it. She verbalized understanding.

## 2025-07-18 NOTE — TELEPHONE ENCOUNTER
Attempted to call patient and without an answer.  He still had ESBL E. coli that was susceptible to Macrobid.  I am going to send in another 5-day course and for him to continue once nightly afterwards.

## 2025-08-13 ENCOUNTER — OFFICE VISIT (OUTPATIENT)
Dept: UROLOGY | Facility: CLINIC | Age: OVER 89
End: 2025-08-13
Payer: MEDICARE

## 2025-08-13 DIAGNOSIS — N39.0 UTI DUE TO EXTENDED-SPECTRUM BETA LACTAMASE (ESBL) PRODUCING ESCHERICHIA COLI: ICD-10-CM

## 2025-08-13 DIAGNOSIS — Z46.6 URINARY CATHETER (FOLEY) CHANGE REQUIRED: ICD-10-CM

## 2025-08-13 DIAGNOSIS — N39.0 URINARY TRACT INFECTION WITHOUT HEMATURIA, SITE UNSPECIFIED: Primary | ICD-10-CM

## 2025-08-13 DIAGNOSIS — Z16.12 UTI DUE TO EXTENDED-SPECTRUM BETA LACTAMASE (ESBL) PRODUCING ESCHERICHIA COLI: ICD-10-CM

## 2025-08-13 DIAGNOSIS — B96.29 UTI DUE TO EXTENDED-SPECTRUM BETA LACTAMASE (ESBL) PRODUCING ESCHERICHIA COLI: ICD-10-CM

## 2025-08-13 PROCEDURE — 87186 SC STD MICRODIL/AGAR DIL: CPT

## 2025-08-13 PROCEDURE — 87086 URINE CULTURE/COLONY COUNT: CPT

## 2025-08-13 PROCEDURE — 87077 CULTURE AEROBIC IDENTIFY: CPT

## 2025-08-15 ENCOUNTER — RESULTS FOLLOW-UP (OUTPATIENT)
Dept: UROLOGY | Facility: CLINIC | Age: OVER 89
End: 2025-08-15
Payer: MEDICARE

## 2025-08-15 DIAGNOSIS — B96.29 UTI DUE TO EXTENDED-SPECTRUM BETA LACTAMASE (ESBL) PRODUCING ESCHERICHIA COLI: Primary | ICD-10-CM

## 2025-08-15 DIAGNOSIS — N39.0 UTI DUE TO EXTENDED-SPECTRUM BETA LACTAMASE (ESBL) PRODUCING ESCHERICHIA COLI: Primary | ICD-10-CM

## 2025-08-15 DIAGNOSIS — Z16.12 UTI DUE TO EXTENDED-SPECTRUM BETA LACTAMASE (ESBL) PRODUCING ESCHERICHIA COLI: Primary | ICD-10-CM

## 2025-08-15 LAB — BACTERIA SPEC AEROBE CULT: ABNORMAL

## 2025-08-15 RX ORDER — CIPROFLOXACIN 500 MG/1
500 TABLET, FILM COATED ORAL EVERY 12 HOURS
Qty: 10 TABLET | Refills: 0 | Status: SHIPPED | OUTPATIENT
Start: 2025-08-15

## (undated) DEVICE — PREMIUM WET SKIN PREP TRAY: Brand: MEDLINE INDUSTRIES, INC.

## (undated) DEVICE — HOLDER: Brand: DEROYAL

## (undated) DEVICE — PATIENT RETURN ELECTRODE, SINGLE-USE, CONTACT QUALITY MONITORING, ADULT, WITH 9FT CORD, FOR PATIENTS WEIGING OVER 33LBS. (15KG): Brand: MEGADYNE

## (undated) DEVICE — CATHETER,FOLEY,SILI-ELAST,LTX,18FR,10ML: Brand: MEDLINE

## (undated) DEVICE — DRAPE,LAPAROTOMY,PED,STERILE: Brand: MEDLINE

## (undated) DEVICE — DRAINBAG,ANTI-REFLUX TOWER,L/F,2000ML,LL: Brand: MEDLINE

## (undated) DEVICE — PK BASIC 70

## (undated) DEVICE — ELECTRD NDL EZ CLN MOD 2.75IN

## (undated) DEVICE — SUT GUT CHRM 4/0 RB1 27IN U203H

## (undated) DEVICE — GOWN,REINF,POLY,ECL,PP SLV,XXL: Brand: MEDLINE

## (undated) DEVICE — SUT GUT CHRM 3/0 SH 27IN G122H

## (undated) DEVICE — PENCL ES MEGADINE EZ/CLEAN BUTN W/HOLSTR 10FT

## (undated) DEVICE — BNDG GZ SOF-FORM CONFRM 2X75IN LF STRL

## (undated) DEVICE — GLV SURG PREMIERPRO MIC LTX PF SZ8 BRN